# Patient Record
Sex: FEMALE | Race: WHITE | NOT HISPANIC OR LATINO | Employment: OTHER | URBAN - METROPOLITAN AREA
[De-identification: names, ages, dates, MRNs, and addresses within clinical notes are randomized per-mention and may not be internally consistent; named-entity substitution may affect disease eponyms.]

---

## 2017-01-19 PROBLEM — Z79.899 LONG-TERM USE OF HIGH-RISK MEDICATION: Status: ACTIVE | Noted: 2017-01-19

## 2017-07-19 PROBLEM — F17.200 CURRENT EVERY DAY SMOKER: Status: ACTIVE | Noted: 2017-07-19

## 2017-07-19 PROBLEM — R06.02 SHORTNESS OF BREATH: Status: ACTIVE | Noted: 2017-07-19

## 2017-07-19 PROBLEM — R07.9 CHEST PAIN: Status: ACTIVE | Noted: 2017-07-19

## 2018-03-22 PROBLEM — R07.9 CHEST PAIN: Status: RESOLVED | Noted: 2017-07-19 | Resolved: 2018-03-22

## 2018-03-22 PROBLEM — R06.02 SHORTNESS OF BREATH: Status: RESOLVED | Noted: 2017-07-19 | Resolved: 2018-03-22

## 2018-04-05 PROBLEM — E78.00 HYPERCHOLESTEROLEMIA: Status: ACTIVE | Noted: 2018-04-05

## 2018-12-06 ENCOUNTER — HOSPITAL ENCOUNTER (OUTPATIENT)
Dept: RADIOLOGY | Facility: HOSPITAL | Age: 63
Discharge: HOME | End: 2018-12-06
Attending: NURSE PRACTITIONER
Payer: COMMERCIAL

## 2018-12-06 DIAGNOSIS — R05.3 PERSISTENT COUGH: ICD-10-CM

## 2018-12-06 DIAGNOSIS — Z87.891 FORMER SMOKER: ICD-10-CM

## 2018-12-07 PROBLEM — F17.200 CURRENT EVERY DAY SMOKER: Status: RESOLVED | Noted: 2017-07-19 | Resolved: 2018-12-07

## 2018-12-19 ENCOUNTER — HOSPITAL ENCOUNTER (OUTPATIENT)
Dept: RADIOLOGY | Facility: HOSPITAL | Age: 63
Discharge: HOME | End: 2018-12-19
Attending: NURSE PRACTITIONER
Payer: COMMERCIAL

## 2018-12-19 VITALS — BODY MASS INDEX: 21.47 KG/M2 | HEIGHT: 70 IN | WEIGHT: 150 LBS

## 2018-12-19 DIAGNOSIS — Z12.39 SCREENING FOR BREAST CANCER: ICD-10-CM

## 2018-12-19 PROCEDURE — 77067 SCR MAMMO BI INCL CAD: CPT | Mod: 26,,, | Performed by: RADIOLOGY

## 2018-12-19 PROCEDURE — 77067 SCR MAMMO BI INCL CAD: CPT | Mod: TC

## 2019-01-02 ENCOUNTER — HOSPITAL ENCOUNTER (OUTPATIENT)
Dept: RADIOLOGY | Facility: HOSPITAL | Age: 64
Discharge: HOME | End: 2019-01-02
Attending: NURSE PRACTITIONER
Payer: COMMERCIAL

## 2019-01-02 DIAGNOSIS — R92.8 ABNORMAL MAMMOGRAM OF LEFT BREAST: ICD-10-CM

## 2019-01-02 PROCEDURE — 76642 ULTRASOUND BREAST LIMITED: CPT | Mod: TC,LT

## 2019-01-02 PROCEDURE — 76642 US BREAST LEFT LIMITED: ICD-10-PCS | Mod: 26,LT,, | Performed by: RADIOLOGY

## 2019-01-02 PROCEDURE — 77065 DX MAMMO INCL CAD UNI: CPT | Mod: TC,LT

## 2019-01-02 PROCEDURE — 76642 ULTRASOUND BREAST LIMITED: CPT | Mod: 26,LT,, | Performed by: RADIOLOGY

## 2019-01-02 PROCEDURE — 77065 DX MAMMO INCL CAD UNI: CPT | Mod: 26,LT,, | Performed by: RADIOLOGY

## 2019-01-02 PROCEDURE — 77065 MAMMO DIGITAL DIAGNOSTIC LEFT WITH CAD: ICD-10-PCS | Mod: 26,LT,, | Performed by: RADIOLOGY

## 2019-01-10 ENCOUNTER — OFFICE VISIT (OUTPATIENT)
Dept: SURGERY | Facility: CLINIC | Age: 64
End: 2019-01-10
Payer: COMMERCIAL

## 2019-01-10 VITALS
BODY MASS INDEX: 21.62 KG/M2 | OXYGEN SATURATION: 96 % | HEART RATE: 79 BPM | RESPIRATION RATE: 18 BRPM | SYSTOLIC BLOOD PRESSURE: 108 MMHG | WEIGHT: 151 LBS | HEIGHT: 70 IN | TEMPERATURE: 98 F | DIASTOLIC BLOOD PRESSURE: 68 MMHG

## 2019-01-10 DIAGNOSIS — R92.8 ABNORMAL MAMMOGRAM: Primary | ICD-10-CM

## 2019-01-10 PROCEDURE — 99204 OFFICE O/P NEW MOD 45 MIN: CPT | Mod: S$GLB,,, | Performed by: SURGERY

## 2019-01-10 PROCEDURE — 99204 PR OFFICE/OUTPT VISIT, NEW, LEVL IV, 45-59 MIN: ICD-10-PCS | Mod: S$GLB,,, | Performed by: SURGERY

## 2019-01-10 NOTE — PROGRESS NOTES
[facility] H&P Note    Subjective:       Patient ID: Gina Mackenzie is a 63 y.o. female.    Chief Complaint: Consult (Abnormal Mammo)    HPI:  Gina Mackenzie is a 63 y.o. 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  Patient stated that she usually does yearly mammograms for screening.  Last mammogram however was in 2016.  She recently underwent routine screening mammograms which led to an ultrasound.  Ultrasound showed new lesions within patient's left breast.  Patient does monthly breast exams.  She has not noticed any changes.  No skin changes no nipple discharge no nipple inversion.  Patient has been pregnant 4 times.  She has given birth to 4 children.  She breast fed for a total of approximately 2 years.  Fifteen years she used oral contraceptive pills.  She is currently on hormone replacement therapy for the last 15 years, low-dose.  Age of menarche was 12 years of age age of menopause was 45 years of age.  Patient now presents today as a new patient referral for evaluation of abnormal breast imaging    Past Medical History:   Diagnosis Date    Hypothyroidism     Malignant neoplasm of skin     Non-melanoma skin cancer    Squamous cell carcinoma in situ of skin of left lower leg     Wellness examination      Past Surgical History:   Procedure Laterality Date    AUGMENTATION OF BREAST       SECTION      COLONOSCOPY  2015    hpp    MALIGNANT SKIN LESION EXCISION  2016     Family History   Problem Relation Age of Onset    Heart failure Father     Hypertension Father     Stroke Sister     Stroke Brother     Pancreatic cancer Maternal Grandmother     Breast cancer Neg Hx      Social History     Socioeconomic History    Marital status: Unknown     Spouse name: None    Number of children: None    Years  of education: None    Highest education level: None   Social Needs    Financial resource strain: None    Food insecurity - worry: None    Food insecurity - inability: None    Transportation needs - medical: None    Transportation needs - non-medical: None   Occupational History    None   Tobacco Use    Smoking status: Former Smoker     Types: Cigarettes     Last attempt to quit: 2013     Years since quittin.0   Substance and Sexual Activity    Alcohol use: No    Drug use: No    Sexual activity: None   Other Topics Concern    None   Social History Narrative    None       Current Outpatient Medications   Medication Sig Dispense Refill    buPROPion (WELLBUTRIN) 75 MG tablet Take 1 tablet (75 mg total) by mouth 2 (two) times daily. 60 tablet 11    estradiol (ESTRACE) 0.5 MG tablet Take 1 tablet (0.5 mg total) by mouth once daily. 90 tablet 3    levothyroxine (SYNTHROID) 137 MCG Tab tablet Take 1 tablet (137 mcg total) by mouth before breakfast. 90 tablet 3    medroxyPROGESTERone (PROVERA) 2.5 MG tablet Take 1 tablet (2.5 mg total) by mouth once daily. 90 tablet 3     No current facility-administered medications for this visit.      Review of patient's allergies indicates:   Allergen Reactions    Codeine Hives and Swelling       Review of Systems   Constitutional: Negative for activity change, appetite change, chills and fever.   HENT: Negative for congestion, dental problem and ear discharge.    Eyes: Negative for discharge and itching.   Respiratory: Negative for apnea, choking and chest tightness.    Cardiovascular: Negative for chest pain and leg swelling.   Gastrointestinal: Negative for abdominal distention, abdominal pain, anal bleeding, constipation, diarrhea and nausea.   Endocrine: Negative for cold intolerance and heat intolerance.   Genitourinary: Negative for difficulty urinating and dyspareunia.   Musculoskeletal: Negative for arthralgias and back pain.   Skin: Negative for color  "change and pallor.   Neurological: Negative for dizziness and facial asymmetry.   Hematological: Negative for adenopathy. Does not bruise/bleed easily.   Psychiatric/Behavioral: Negative for agitation and behavioral problems.       Objective:      Vitals:    01/10/19 1315   BP: 108/68   BP Location: Right arm   Patient Position: Sitting   Pulse: 79   Resp: 18   Temp: 98.4 °F (36.9 °C)   TempSrc: Oral   SpO2: 96%   Weight: 68.5 kg (151 lb)   Height: 5' 10" (1.778 m)     Physical Exam   Constitutional: She is oriented to person, place, and time. She appears well-developed and well-nourished. No distress.   HENT:   Head: Normocephalic and atraumatic.   Eyes: EOM are normal. Pupils are equal, round, and reactive to light.   Neck: Normal range of motion. Neck supple. No thyromegaly present.   Cardiovascular: Normal rate and regular rhythm.   Pulmonary/Chest: Effort normal and breath sounds normal.   Bilateral breast examined in the upright and supine position. Bilateral axillas also examined. No evidence of skin changes, nipple inversion, nipple discharge, adenopathy in either breast.  No concerning findings.   Abdominal: Soft. Bowel sounds are normal. She exhibits no distension. There is no tenderness.   Musculoskeletal: Normal range of motion. She exhibits no edema or deformity.   Neurological: She is alert and oriented to person, place, and time. No cranial nerve deficit.   Skin: Skin is warm. Capillary refill takes less than 2 seconds. No erythema.   Psychiatric: She has a normal mood and affect. Her behavior is normal.       Lab Review:   CBC:   Lab Results   Component Value Date    WBC 5.6 12/04/2018    RBC 4.50 12/04/2018    HGB 14.6 12/04/2018    HCT 42.2 12/04/2018     12/04/2018     BMP:   Lab Results   Component Value Date    GLU 82 12/04/2018     12/04/2018    K 4.6 12/04/2018    CL 97 (L) 12/04/2018    CO2 27 12/04/2018    BUN 16 12/04/2018    CREATININE 0.83 12/04/2018    CALCIUM 9.3 12/04/2018 "     Diagnostics Review: Mammogram and ultrasounds reviewed.  Left breast.  There are 2 areas of concern.  Both areas and ultrasound show cystic appearing structures with normal appearing borders.     Assessment:       1. Abnormal mammogram        Plan:   Abnormal mammogram        Medical Decision Making/Counseling:  I reviewed the patient's mammogram and ultrasound.  Patient peers have BI-RADS 3 lesions in the patient's left breast.  Patient appears to be relatively low risk for breast cancer with no family history of breast cancer however she does have a current history of hormone replacement therapy usage.  I discussed with the patient the chances of cancer in BI-RADS 3 lesions to be approximately 1-2%.  Questions answered today in clinic.  After exam review of imaging and discussion with patient, the patient  appeared appreciative of their visit.  In medical recommendations at this time will be for early interval follow-up imaging including mammogram and ultrasound.  If there is a change of these lesions on mammogram and ultrasound, the patient may be referred back to surgery clinic for evaluation for biopsy.  Patient in agreement of plan. Total clinic time spent today with patient 45 min of which greater than half the time spent face-to-face counseling

## 2019-01-10 NOTE — LETTER
January 10, 2019      Lisa Y. Cooksey, ALAN  [address]  [place] [address]           [facility] - General Surgery  [address] [facility] [place] [address]  Phone: [phone]  Fax: [phone]          Patient: Gina Mackenzie   MR Number: [id number]   YOB: 1955   Date of Visit: 1/10/2019       Dear Lisa Y. Cooksey:    Thank you for referring Gina Mackenzie to me for evaluation. Attached you will find relevant portions of my assessment and plan of care.    If you have questions, please do not hesitate to call me. I look forward to following Gina Mackenzie along with you.    Sincerely,    Warren Liu MD    Enclosure  CC:  No Recipients    If you would like to receive this communication electronically, please contact [email or web address] or [phone] to request more information on [facility] Link access.    For providers and/or their staff who would like to refer a patient to [facility], please contact us through our one-stop-shop provider referral line, [facility], at [phone].    If you feel you have received this communication in error or would no longer like to receive these types of communications, please e-mail [email or web address]

## 2019-02-07 PROBLEM — F32.A DEPRESSION: Status: ACTIVE | Noted: 2019-02-07

## 2019-04-08 ENCOUNTER — HOSPITAL ENCOUNTER (OUTPATIENT)
Dept: RADIOLOGY | Facility: HOSPITAL | Age: 64
Discharge: HOME | End: 2019-04-08
Attending: NURSE PRACTITIONER
Payer: COMMERCIAL

## 2019-04-08 DIAGNOSIS — M25.512 ACUTE PAIN OF LEFT SHOULDER: ICD-10-CM

## 2019-04-08 DIAGNOSIS — M54.2 NECK PAIN ON LEFT SIDE: ICD-10-CM

## 2019-04-15 ENCOUNTER — TELEPHONE (OUTPATIENT)
Dept: ORTHOPEDICS | Facility: CLINIC | Age: 64
End: 2019-04-15

## 2019-04-15 PROBLEM — Z87.891 HISTORY OF TOBACCO ABUSE: Status: ACTIVE | Noted: 2019-04-15

## 2019-04-15 PROBLEM — B35.3 TINEA PEDIS, LEFT: Status: ACTIVE | Noted: 2019-04-15

## 2019-04-15 PROBLEM — M19.012 LOCALIZED OSTEOARTHROSIS OF LEFT SHOULDER REGION: Status: ACTIVE | Noted: 2019-04-15

## 2019-04-15 PROBLEM — Z78.0 POST-MENOPAUSAL: Status: ACTIVE | Noted: 2019-04-15

## 2019-04-15 PROBLEM — M50.30 DDD (DEGENERATIVE DISC DISEASE), CERVICAL: Status: ACTIVE | Noted: 2019-04-15

## 2019-04-16 ENCOUNTER — TELEPHONE (OUTPATIENT)
Dept: ORTHOPEDICS | Facility: CLINIC | Age: 64
End: 2019-04-16

## 2019-04-16 NOTE — TELEPHONE ENCOUNTER
----- Message from Vicky Wilcox sent at 4/15/2019 12:49 PM CDT -----  Contact: [phone]  Patient is returning nurse's phone call.  Please call patient back at [phone].

## 2019-04-16 NOTE — TELEPHONE ENCOUNTER
----- Message from Kate Galeano sent at 4/16/2019  7:53 AM CDT -----  Type:  Patient Returning Call    Who Called:  self  Who Left Message for Patient:  Macarena  Does the patient know what this is regarding?:  appointment  Best Call Back Number:   [phone]

## 2019-04-24 ENCOUNTER — OFFICE VISIT (OUTPATIENT)
Dept: ORTHOPEDICS | Facility: CLINIC | Age: 64
End: 2019-04-24
Payer: COMMERCIAL

## 2019-04-24 VITALS
SYSTOLIC BLOOD PRESSURE: 104 MMHG | RESPIRATION RATE: 18 BRPM | WEIGHT: 162.94 LBS | HEIGHT: 70 IN | BODY MASS INDEX: 23.33 KG/M2 | DIASTOLIC BLOOD PRESSURE: 49 MMHG | HEART RATE: 71 BPM

## 2019-04-24 DIAGNOSIS — M75.112 NONTRAUMATIC INCOMPLETE TEAR OF ROTATOR CUFF, LEFT: Primary | ICD-10-CM

## 2019-04-24 DIAGNOSIS — M19.012 ARTHRITIS OF LEFT ACROMIOCLAVICULAR JOINT: ICD-10-CM

## 2019-04-24 DIAGNOSIS — M25.512 ACUTE PAIN OF LEFT SHOULDER: Primary | ICD-10-CM

## 2019-04-24 DIAGNOSIS — M19.012 GLENOHUMERAL ARTHRITIS, LEFT: ICD-10-CM

## 2019-04-24 PROCEDURE — 99204 PR OFFICE/OUTPT VISIT, NEW, LEVL IV, 45-59 MIN: ICD-10-PCS | Mod: 25,S$GLB,, | Performed by: ORTHOPAEDIC SURGERY

## 2019-04-24 PROCEDURE — 20610 DRAIN/INJ JOINT/BURSA W/O US: CPT | Mod: LT,S$GLB,, | Performed by: ORTHOPAEDIC SURGERY

## 2019-04-24 PROCEDURE — 99999 PR PBB SHADOW E&M-EST. PATIENT-LVL III: CPT | Mod: PBBFAC,,, | Performed by: ORTHOPAEDIC SURGERY

## 2019-04-24 PROCEDURE — 99204 OFFICE O/P NEW MOD 45 MIN: CPT | Mod: 25,S$GLB,, | Performed by: ORTHOPAEDIC SURGERY

## 2019-04-24 PROCEDURE — 20610 LARGE JOINT ASPIRATION/INJECTION: L GLENOHUMERAL: ICD-10-PCS | Mod: LT,S$GLB,, | Performed by: ORTHOPAEDIC SURGERY

## 2019-04-24 PROCEDURE — 99999 PR PBB SHADOW E&M-EST. PATIENT-LVL III: ICD-10-PCS | Mod: PBBFAC,,, | Performed by: ORTHOPAEDIC SURGERY

## 2019-04-24 RX ORDER — TRIAMCINOLONE ACETONIDE 40 MG/ML
40 INJECTION, SUSPENSION INTRA-ARTICULAR; INTRAMUSCULAR
Status: DISCONTINUED | OUTPATIENT
Start: 2019-04-24 | End: 2019-04-24 | Stop reason: HOSPADM

## 2019-04-24 RX ADMIN — TRIAMCINOLONE ACETONIDE 40 MG: 40 INJECTION, SUSPENSION INTRA-ARTICULAR; INTRAMUSCULAR at 02:04

## 2019-04-24 NOTE — PROGRESS NOTES
Subjective:      Patient ID: Gina Mackenzie is a 63 y.o. female.    Chief Complaint: Pain of the Left Shoulder    Referring Provider: Lisa Y. Cooksey, Np  [address] [place] [address]    HPI:  Ms. Mackenzie is a 63-year-old right-hand-dominant female who presented today for evaluation of right shoulder pain which began 2018 of insidious onset she denied trauma.  She stated that she has been getting progressive decreased motion and a burning sensation in her shoulder occasionally.  She has seen a chiropractor which did not help.  She also went to massage therapist which did not help.  Abduction and external rotation increase her symptoms while nothing seems to improve it.  Her symptoms do not awaken her at night.  She has taken NSAIDs with some help.  She has not done physical therapy but has used to chiropractor without help.  She has not had injections.    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    ROS:   Review of Systems   Constitution: Negative for chills and fever.   HENT: Negative for congestion.    Eyes: Negative for blurred vision.   Cardiovascular: Negative for chest pain.   Respiratory: Negative for cough.    Endocrine: Negative for polydipsia.   Hematologic/Lymphatic: Does not bruise/bleed easily.   Skin: Positive for skin cancer.   Musculoskeletal: Positive for joint pain. Negative for gout.   Gastrointestinal: Negative for constipation, diarrhea and heartburn.   Genitourinary: Negative for nocturia.   Neurological: Negative for headaches and seizures.   Psychiatric/Behavioral: Negative for depression.   Allergic/Immunologic: Negative for environmental allergies.           Objective:      Physical Exam:   General: AAOx3.  No acute distress  HEENT: Normocephalic, PEARLA EOMI, Good Dentition  Neck: Supple, No JVD  Chest: Symetric, equal excursion on inspiration  Abdomen: Soft NTND  Vascular:  Pulses intact and equal bilaterally.  Capillary refill less than 3 seconds and equal bilaterally  Neurologic:  Pinprick and soft touch intact and equal bilaterally.  Spurling's negative  Integment:  No ecchymosis, no errythema  Extremity:  Shoulder:  Forward flexion/abduction equal bilaterally 0/170 degrees. Internal rotation right shoulder T6, left shoulder L3.  Negative drop-arm both shoulders.  Negative lift-off both shoulders.  Full can negative both shoulders.  Empty can mildly positive left shoulder.  Hubbard/Neer positive left shoulder.  Cross-arm negative both shoulders.  Nontender over the AC joint both shoulders.  Minor tenderness in the bicipital groove left shoulder.  Yergason's negative both shoulders.  Apprehension/relocation negative both shoulders.  Radiography:  Personally reviewed x-rays of the left shoulder completed on 04/08/2019 which showed AC and glenohumeral  arthritis no fracture or dislocation.  X-rays of the C-spine completed on 04/08/2019 showed flattening of the cervical lordosis arthritic changes from C4-C7 with foraminal stenosis C4-C7.      Assessment:       Impression:      1. Nontraumatic incomplete tear of rotator cuff, left    2. Glenohumeral arthritis, left    3. Arthritis of left acromioclavicular joint          Plan:       1.  Discussed physical examination and radiographic findings with the patient. Gina understands that she has an incomplete rotator cuff tear but with some conservative management she may improve if she does not improve with conservative management then she could consider surgical intervention.  2.  Offered a steroid injection to the left shoulder, she elected to proceed.  3.  Discussed NSAID use with the patient.  4.  Home exercises to include wall walking, cane exercises, towel exercises, and Codman exercises were shown discussed.  5.  Refer to occupational therapy start motion exercises.  6.  [facility] portal was discussed with the patient and information was given.  The patient was encouraged to use the portal for future encounters.  7.  Follow up in approximately 6 weeks for re-evaluation if the patient has not improve may consider referral for an MRI and discuss surgery at that time.

## 2019-04-24 NOTE — PROCEDURES
Large Joint Aspiration/Injection: L glenohumeral  Date/Time: 4/24/2019 2:46 PM  Performed by: Skyler Phoenix DO  Authorized by: Skyler Phoenix, DO     Consent Done?:  Yes (Verbal)  Indications:  Pain and diagnostic evaluation  Procedure site marked: Yes    Timeout: Prior to procedure the correct patient, procedure, and site was verified      Location:  Shoulder  Site:  L glenohumeral  Prep: Patient was prepped and draped in usual sterile fashion    Ultrasonic Guidance for needle placement: No  Needle size:  22 G  Approach:  Posterior  Medications:  40 mg triamcinolone acetonide 40 mg/mL  Patient tolerance:  Patient tolerated the procedure well with no immediate complications

## 2019-04-24 NOTE — LETTER
April 24, 2019      Lisa Y. Cooksey, ALAN  [address] [place] [address]           [facility] - Orthopedics  [address] [place] [address]  [place] [address]  Phone: [phone]  Fax: [phone]          Patient: Gina Mackenzie   MR Number: [id number]   YOB: 1955   Date of Visit: 4/24/2019       Dear Lisa Y. Cooksey:    Thank you for referring Gina Mackenzie to me for evaluation. Attached you will find relevant portions of my assessment and plan of care.    If you have questions, please do not hesitate to call me. I look forward to following Gina Mackenzie along with you.    Sincerely,    Skyler Phoenix, DO    Enclosure  CC:  No Recipients    If you would like to receive this communication electronically, please contact [email or web address] or [phone] to request more information on [facility] Link access.    For providers and/or their staff who would like to refer a patient to [facility], please contact us through our one-stop-shop provider referral line, [facility], at [phone].    If you feel you have received this communication in error or would no longer like to receive these types of communications, please e-mail [email or web address]

## 2019-04-29 ENCOUNTER — TELEPHONE (OUTPATIENT)
Dept: ORTHOPEDICS | Facility: CLINIC | Age: 64
End: 2019-04-29

## 2019-04-29 NOTE — TELEPHONE ENCOUNTER
----- Message from Florinda Hough sent at 4/29/2019 11:22 AM CDT -----  Contact: Patient  Type: Needs Medical Advice    Who Called:  patient  Symptoms (please be specific):  tomás  How long has patient had these symptoms:  tomás  Pharmacy name and phone #:  tomás  Best Call Back Number: [phone]  Additional Information: Patient states she was wandering is the physical therapy office going to contact her for her appointment.Please call to advise. Thanks!

## 2019-04-29 NOTE — TELEPHONE ENCOUNTER
Contacted [facility]. Patient will be contacted to schedule an appointment.

## 2019-04-29 NOTE — TELEPHONE ENCOUNTER
Pt called to check on PT referral. Pt called, LM insurance approval is still pending and to call [phone] for any questions

## 2019-05-06 ENCOUNTER — CLINICAL SUPPORT (OUTPATIENT)
Dept: REHABILITATION | Facility: HOSPITAL | Age: 64
End: 2019-05-06
Attending: ORTHOPAEDIC SURGERY
Payer: COMMERCIAL

## 2019-05-06 DIAGNOSIS — M19.012 GLENOHUMERAL ARTHRITIS, LEFT: ICD-10-CM

## 2019-05-06 DIAGNOSIS — M19.012 LOCALIZED OSTEOARTHROSIS OF LEFT SHOULDER REGION: ICD-10-CM

## 2019-05-06 DIAGNOSIS — M25.612 DECREASED ROM OF LEFT SHOULDER: Primary | ICD-10-CM

## 2019-05-06 PROCEDURE — 97161 PT EVAL LOW COMPLEX 20 MIN: CPT | Mod: PN

## 2019-05-06 NOTE — PLAN OF CARE
[facility]  Physical Therapy Initial Evaluation    Name: Gina Mackenzie  Clinic Number: [id number]    Therapy Diagnosis:   Encounter Diagnoses   Name Primary?    Glenohumeral arthritis, left     Localized osteoarthrosis of left shoulder region     Decreased ROM of left shoulder Yes     Physician: Skyler Phoenix DO    Physician Orders: PT Eval and Treat   Medical Diagnosis: Left Shoulder RTC tear  Evaluation Date: 2019  Authorization period Expiration: 2019  Plan of Care Certification Period: 2019    Visit #: 1/ Visits authorized: 12  Time In:2:00 PM   Time Out: 3:00 PM  Total Billable Time: 50 minutes    Precautions: Standard    Subjective   Date of onset: 2018  Date of Surgery: N/A - did have steroid injection 2 weeks     Past Medical History:   Diagnosis Date    Hypothyroidism     Malignant neoplasm of skin     Non-melanoma skin cancer    Squamous cell carcinoma in situ of skin of left lower leg 2016    Wellness examination      Gina Mackenzie  has a past surgical history that includes  section (); Malignant skin lesion excision (2016); Colonoscopy (2015); and Augmentation of breast.    Gina has a current medication list which includes the following prescription(s): bupropion, clotrimazole-betamethasone 1-0.05%, estradiol, levothyroxine, and medroxyprogesterone.    Review of patient's allergies indicates:   Allergen Reactions    Codeine Hives and Swelling        Imaging, : Xray of left shoulder    AC and glenohumeral arthritis no fracture or dislocation.  X-rays of the C-spine completed on 2019 showed flattening of the cervical lordosis arthritic changes from C4-C7 with foraminal stenosis C4-C7.        Prior Therapy: No prior history of therapy; did go to chiropracter and massage therapy for left shoulder without any lasting pain relief.   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   Current Level of Function: currently still working; but noting decreased ability to use her left arm for functional activities.     Pain:  Current 1/10, worst 9/10, best 0/10   Location: shoulder  left  Description: Aching and Grabbing  Aggravating Factors: using her arm above her head or behind her back for activities.   Easing Factors: rest; steroid injection .      Onset/JESSE: insidious - not sure what caused her shoulder to start hurting.     History of current condition - Gina reports: 6 month  history of symptoms, including shoulder pain, decreased ROM, decreased strength     Pts goals: To get rid of my shoulder pain and return to PLOF         Objective     Observation: Gina is alert/oriented x 4; currently going to the gym, but quit doing UE exercises until she finds out what she should do; She is reporting significant improvement in her pain and ability to use her arm since receiving asteroid injection a couple of weeks ago.     Posture:     -       Affected scapula abducted    Shoulder Range of Motion:   Left active Left Passive Right active  Right Passive   Flexion 152 156 170 170   Abduction 142 145 170 170   Extension 50 53 55 55   Ext. Rotation HBH to T1 70 HBH to T2 80   Int. Rotation Hand to posterior iliac crest 54 TUB to T6  85         Upper Extremity Strength  (R) UE  (L) UE    Shoulder flexion: 5/5 Shoulder flexion: 5/5   Shoulder Abduction: 5/5 Shoulder abduction: 5/5   Shoulder ER 5/5 Shoulder ER 4/5   Shoulder IR 5/5 Shoulder IR 5/5   Lower Trap 4+/5 Lower Trap 4+/5   Middle Trap 4+/5 Middle Trap 4+/5   Rhomboids 4+/5 Rhomboids 4+/5     Special Tests:   Left Right   AC Joint Compression Test Negative Negative   Empty Can Test Positive Negative   Drop Arm test Negative Negative   Subscaputlaris Lift Off Negative Negative    Clunk test Negative Negative   O'emmanuel's test Negative Negative   Hawkin's Kenndy Negative Negative   Neer's Test Positive Negative   Speed's test Negative Negative   Anterior Apprehension test Negative Negative   Relocation test Negative Negative   Posterior Apprehension test Negative Negative   Sulcus Sign Negative Negative     Joint mobility: restricted    Palpation:minimal tenderness to palpation at supraspinatous; upper trap; subscapularis    Sensation: intact to light touch     Flexibility:     Upper Trap = R no restriction, L no restriction   Scalenes: R no restriction, L no restriction   SCM: R no restriction, L no restriction   Levator Scap: R no restriction, L no restriction    Scapular Control/Dyskinesis:    Normal / Subtle / Obvious  Comments    Left  Decreased scapular movement Tightness noted    Right  Normal  n/a       PT Evaluation Completed? Yes  Discussed Plan of Care with patient: Yes    TREATMENT   Gina received therapeutic exercises to develop ROM for 15 minutes including:  Wall Slides - bilateral hands - keep neck retracted  Scapular retraction with tband  Sleeper stretch       Home Exercises and Patient Education Provided    Education provided re:   - progress towards goals   - role of therapy in multi - disciplinary team, goals for therapy  Pt educated on condition, POC, and expectations in therapy.  No spiritual or educational barriers to learning provided    Home exercises:  Pt will be provided HEP during course of treatment with progressions as appropriate. Pt was advised to perform these exercises free of pain, and to stop performing them if pain occurs.   Gina demonstrated good  understanding of the education provided.       Functional Limitations Reports - G Codes  Category: Mobility, Body position, Carrying, Self care, Other  Tool: UEFS  Score: 45% limitation     Assessment   Gina is a 63 y.o. female referred to outpatient physical therapy and presents to PT with left shoulder  pain and loss of ROM . Patient demonstrates limitations as described in the problem list. Pt will benefit from physcial therapy services in order to maximize pain free and/or functional use of left Shoulder. The following goals were discussed with the patient and patient is in agreement with them as to be addressed in the treatment plan.   Pt prognosis is Excellent.   Pt will benefit from skilled outpatient Physical Therapy to address the deficits stated above and in the chart below, provide pt/family education, and to maximize pt's level of independence.     Plan of care discussed with patient: Yes  Pt's spiritual, cultural and educational needs considered and pt agreeable to plan of care and goals as stated below:     Anticipated Barriers for therapy: none    Medical necessity is demonstrated by the following IMPAIRMENTS/PROBLEM LIST:    weakness, decreased upper extremity function, pain and decreased ROM    GOALS:     Long Term Goals: 6 weeks  Pain: Decrease pain to 0/10 to allow for return of full Left shoulder function   Strength: Improve strength in left shoulder and periscapular muscles to 5/5 for improved shoulder stability  ROM: Improve ROM to 100% of normal limits   Functional scale: Improve score on UEFS to <5% limitation   Lifting: Lift 40 lbs to waist level, 20 lbs to shoulder level, 10 lbs to overhead without pain or compensation  Postures: Increase sitting and/or standing duration to 60 mins without pain   Transfers: Perform home care patient transfers without increased pain or limitation  Exercise: demonstrate independence with home exercise program to maintain gains made in therapy.          Plan   Certification Period: 5/6/2019 to 7/31/2019.    Outpatient physical therapy 1 -2 times weekly to include: Manual Therapy, Moist Heat/ Ice, Neuromuscular Re-ed, Patient Education and Therapeutic Exercise. Cont PT for 2 months.   Pt may be seen by PTA as part of the rehabilitation team.     I certify the  need for these services furnished under this plan of treatment and while under my care.    Amairani Quinones, PT          Attestation:   I have seen the patient, reviewed the therapist's plan of care, and I agree with the plan of care.   I certify the need for these services furnished under this plan of treatment and while under my care.         _______________            ________                                               _____________________  Physician/Referring Practitioner                                                            Date of Signature

## 2019-05-10 ENCOUNTER — CLINICAL SUPPORT (OUTPATIENT)
Dept: REHABILITATION | Facility: HOSPITAL | Age: 64
End: 2019-05-10
Attending: ORTHOPAEDIC SURGERY
Payer: COMMERCIAL

## 2019-05-10 DIAGNOSIS — M19.012 LOCALIZED OSTEOARTHROSIS OF LEFT SHOULDER REGION: ICD-10-CM

## 2019-05-10 DIAGNOSIS — M25.612 DECREASED ROM OF LEFT SHOULDER: Primary | ICD-10-CM

## 2019-05-10 PROCEDURE — 97140 MANUAL THERAPY 1/> REGIONS: CPT | Mod: PN

## 2019-05-10 PROCEDURE — 97110 THERAPEUTIC EXERCISES: CPT | Mod: PN

## 2019-05-10 NOTE — PROGRESS NOTES
"                                                    Physical Therapy Daily Note     Name: Gina Mackenzie  Clinic Number: [id number]  Diagnosis:   Encounter Diagnoses   Name Primary?    Decreased ROM of left shoulder Yes    Localized osteoarthrosis of left shoulder region      Physician: Skyler Phoenix,   Precautions: standard  Visit #: 2 of 12  PTA Visit #: 0  Time In: 7:00 AM  Time Out: 8:00 AM     Subjective     Pt reports: "I'm about the same"   Pain Scale: Gina rates pain on a scale of 0-10 to be 4 currently.    Objective     Gina received individual therapeutic exercises to develop strength and ROM for 20 minutes includin. SL Ext Rotation: 3 # x 15   2. Serratus Lifts: 3# x 20  3. SL external Rotation 2# x 15  4. Prone series - abduction, extension, rows, flexion: 3# x 15 each  5. Wall Push-Ups 2-way x 15 each  6. 3-way Scapular Retraction - gray tband x 15 each  7. Shld IR/ER x 15 each with green tband  8. Lat pulldown  9. Seated Row     Gina received the following manual therapy techniques: Joint mobilizations and Soft tissue Mobilization were applied to the: left shoulder  for 25 minutes including:  Grade II to II GH glides into lateral, inferior and posterior planes; Sleeper stretch; posterior capsule stretch; subscapularis - MET to release restrictions; teres minor: contract/relax to improve GH movement.      The patient received the following direct contact modalities after being cleared for contraindications:     The patient received the following supervised modalities after being cleared for contradictions:     Written Home Exercises Provided: see above - given written handouts of each exercise.   Pt demo good understanding of the education provided. Gina demonstrated good return demonstration of activities.     Education provided re:  Gina verbalized good understanding of education provided.   No spiritual or educational barriers to learning provided    Assessment     Patient tolerated " treatment well; able to get hand to spine after manual treatment. .   This is a 63 y.o. female referred to outpatient physical therapy and presents with a medical diagnosis of left shoulder adhesive capsulitis; RTC tear and demonstrates limitations as described in the problem list. Pt prognosis is Excellent. Pt will continue to benefit from skilled outpatient physical therapy to address the deficits listed in the problem list, provide pt/family education and to maximize pt's level of independence in the home and community environment.     Goals as follows:     Long Term Goals: 6 weeks  Pain: Decrease pain to 0/10 to allow for return of full Left shoulder function   Strength: Improve strength in left shoulder and periscapular muscles to 5/5 for improved shoulder stability  ROM: Improve ROM to 100% of normal limits   Functional scale: Improve score on UEFS to <5% limitation   Lifting: Lift 40 lbs to waist level, 20 lbs to shoulder level, 10 lbs to overhead without pain or compensation  Postures: Increase sitting and/or standing duration to 60 mins without pain   Transfers: Perform home care patient transfers without increased pain or limitation  Exercise: demonstrate independence with home exercise program to maintain gains made in therapy.            Plan     Continue with established Plan of Care towards PT goals.    Therapist: Amairani Quinones, PT  5/10/2019

## 2019-05-13 ENCOUNTER — CLINICAL SUPPORT (OUTPATIENT)
Dept: REHABILITATION | Facility: HOSPITAL | Age: 64
End: 2019-05-13
Attending: ORTHOPAEDIC SURGERY
Payer: COMMERCIAL

## 2019-05-13 DIAGNOSIS — M25.612 DECREASED ROM OF LEFT SHOULDER: Primary | ICD-10-CM

## 2019-05-13 PROCEDURE — 97110 THERAPEUTIC EXERCISES: CPT | Mod: PN

## 2019-05-13 PROCEDURE — 97140 MANUAL THERAPY 1/> REGIONS: CPT | Mod: PN

## 2019-05-13 NOTE — PROGRESS NOTES
"                                                    Physical Therapy Daily Note     Name: Gina Mackenzie  Clinic Number: [id number]  Diagnosis:   Encounter Diagnosis   Name Primary?    Decreased ROM of left shoulder Yes     Physician: Skyler Phoenix,   Precautions: standard  Visit #: 3 of 12  PTA Visit #: 0  Time In:  8:00 AM  Time Out:  9:00 AM      Subjective     Pt reports:  My arm was alittle sore, but I have more movement"   Pain Scale: Gina rates pain on a scale of 0-10 to be 4 currently.    Objective     Gina received individual therapeutic exercises to develop strength and ROM for 20 minutes includin. SL Ext Rotation: 3 # x 15   2. Serratus Lifts: 3# x 20  3. SL external Rotation 2# x 15  4. Prone series - abduction, extension, rows, flexion: 3# x 15 each  5. Wall Push-Ups 2-way x 15 each  6. 3-way Scapular Retraction - gray tband x 15 each  7. Shld IR/ER x 15 each with green tband  8. Lat pulldown  9. Seated Row     Gina received the following manual therapy techniques: Joint mobilizations and Soft tissue Mobilization were applied to the: left shoulder  for 25 minutes including:  Grade II to II GH glides into lateral, inferior and posterior planes; Sleeper stretch; posterior capsule stretch; subscapularis - MET to release restrictions; teres minor: contract/relax to improve GH movement.      The patient received the following direct contact modalities after being cleared for contraindications:     The patient received the following supervised modalities after being cleared for contradictions:     Written Home Exercises Provided: see above - given written handouts of each exercise. Added corner stretching for ER and Pec mm.   Pt demo good understanding of the education provided. Gina demonstrated good return demonstration of activities.     Education provided re:  Gina verbalized good understanding of education provided.   No spiritual or educational barriers to learning " provided    Assessment     Patient tolerated treatment well; able to get hand to spine after manual treatment.  Flexion to 158 degrees today; Less restriction in Subscapularis noted.   This is a 63 y.o. female referred to outpatient physical therapy and presents with a medical diagnosis of left shoulder adhesive capsulitis; RTC tear and demonstrates limitations as described in the problem list. Pt prognosis is Excellent. Pt will continue to benefit from skilled outpatient physical therapy to address the deficits listed in the problem list, provide pt/family education and to maximize pt's level of independence in the home and community environment.     Goals as follows:     Long Term Goals: 6 weeks  Pain: Decrease pain to 0/10 to allow for return of full Left shoulder function   Strength: Improve strength in left shoulder and periscapular muscles to 5/5 for improved shoulder stability  ROM: Improve ROM to 100% of normal limits   Functional scale: Improve score on UEFS to <5% limitation   Lifting: Lift 40 lbs to waist level, 20 lbs to shoulder level, 10 lbs to overhead without pain or compensation  Postures: Increase sitting and/or standing duration to 60 mins without pain   Transfers: Perform home care patient transfers without increased pain or limitation  Exercise: demonstrate independence with home exercise program to maintain gains made in therapy.            Plan     Continue with established Plan of Care towards PT goals.    Therapist: Amairani Quinones, PT  5/13/2019

## 2019-05-17 ENCOUNTER — CLINICAL SUPPORT (OUTPATIENT)
Dept: REHABILITATION | Facility: HOSPITAL | Age: 64
End: 2019-05-17
Attending: ORTHOPAEDIC SURGERY
Payer: COMMERCIAL

## 2019-05-17 DIAGNOSIS — M75.112 NONTRAUMATIC INCOMPLETE TEAR OF ROTATOR CUFF, LEFT: ICD-10-CM

## 2019-05-17 DIAGNOSIS — M25.612 DECREASED ROM OF LEFT SHOULDER: Primary | ICD-10-CM

## 2019-05-17 PROCEDURE — 97140 MANUAL THERAPY 1/> REGIONS: CPT | Mod: PN

## 2019-05-17 NOTE — PROGRESS NOTES
"                                                    Physical Therapy Daily Note     Name: Gina Mackenzie  Clinic Number: [id number]  Diagnosis:   Encounter Diagnoses   Name Primary?    Decreased ROM of left shoulder Yes    Nontraumatic incomplete tear of rotator cuff, left      Physician: Skyler Phoenix,   Precautions: standard  Visit #: 4 of 12  PTA Visit #: 0  Time In:  7:00  AM  Time Out: 7:40  AM      Subjective     Pt reports:  My arm was alittle sore, but I have more movement"   Pain Scale: Gina rates pain on a scale of 0-10 to be 4 currently.    Objective     Gina received individual therapeutic exercises to develop strength and ROM for 20 minutes includin. SL Ext Rotation: 3 # x 15   2. Serratus Lifts: 3# x 20  3. SL external Rotation 2# x 15  4. Prone series - abduction, extension, rows, flexion: 3# x 15 each  5. Wall Push-Ups 2-way x 15 each  6. 3-way Scapular Retraction - gray tband x 15 each  7. Shld IR/ER x 15 each with green tband  8. Lat pulldown  9. Seated Row     Gina received the following manual therapy techniques: Joint mobilizations and Soft tissue Mobilization were applied to the: left shoulder  for 25 minutes including:  Grade II to II GH glides into lateral, inferior and posterior planes; Sleeper stretch; posterior capsule stretch; subscapularis - MET to release restrictions; teres minor: contract/relax to improve GH movement.  Added/instructed patient in  PNF patterns to incorporate rotation into overhead shoulder motion.     The patient received the following direct contact modalities after being cleared for contraindications:     The patient received the following supervised modalities after being cleared for contradictions:     Written Home Exercises Provided: see above - given written handouts of each exercise. Added corner stretching for ER and Pec mm.   Pt demo good understanding of the education provided. Gina demonstrated good return demonstration of activities. "     Education provided re:  Gina verbalized good understanding of education provided.   No spiritual or educational barriers to learning provided    Assessment     Patient tolerated treatment well; able to get hand to spine after manual treatment.  Flexion to 158 degrees today; Less restriction in Subscapularis noted.   This is a 63 y.o. female referred to outpatient physical therapy and presents with a medical diagnosis of left shoulder adhesive capsulitis; RTC tear and demonstrates limitations as described in the problem list. Pt prognosis is Excellent. Pt will continue to benefit from skilled outpatient physical therapy to address the deficits listed in the problem list, provide pt/family education and to maximize pt's level of independence in the home and community environment.     Goals as follows:     Long Term Goals: 6 weeks  Pain: Decrease pain to 0/10 to allow for return of full Left shoulder function   Strength: Improve strength in left shoulder and periscapular muscles to 5/5 for improved shoulder stability  ROM: Improve ROM to 100% of normal limits   Functional scale: Improve score on UEFS to <5% limitation   Lifting: Lift 40 lbs to waist level, 20 lbs to shoulder level, 10 lbs to overhead without pain or compensation  Postures: Increase sitting and/or standing duration to 60 mins without pain   Transfers: Perform home care patient transfers without increased pain or limitation  Exercise: demonstrate independence with home exercise program to maintain gains made in therapy.            Plan     Continue with established Plan of Care towards PT goals.    Therapist: Amairani Quinones, PT  5/17/2019

## 2019-05-20 ENCOUNTER — CLINICAL SUPPORT (OUTPATIENT)
Dept: REHABILITATION | Facility: HOSPITAL | Age: 64
End: 2019-05-20
Attending: ORTHOPAEDIC SURGERY
Payer: COMMERCIAL

## 2019-05-20 DIAGNOSIS — M25.612 DECREASED ROM OF LEFT SHOULDER: Primary | ICD-10-CM

## 2019-05-20 DIAGNOSIS — M75.112 NONTRAUMATIC INCOMPLETE TEAR OF ROTATOR CUFF, LEFT: ICD-10-CM

## 2019-05-20 PROCEDURE — 97140 MANUAL THERAPY 1/> REGIONS: CPT | Mod: PN

## 2019-05-20 NOTE — PROGRESS NOTES
Physical Therapy Daily Note     Name: Gina Mackenzie  Clinic Number: [id number]  Diagnosis:   Encounter Diagnoses   Name Primary?    Decreased ROM of left shoulder Yes    Nontraumatic incomplete tear of rotator cuff, left      Physician: Skyler Phoenix,   Precautions: standard  Visit #:  5 of 12  PTA Visit #: 0  Time In:  7:00  AM  Time Out: 7:40  AM      Subjective     Pt reports:  My arm is still sore right at the top. End range pain noted.   Pain Scale: Gina rates pain on a scale of 0-10 to be 4 currently.    Objective     Gina received individual therapeutic exercises to develop strength and ROM for 20 minutes includin. SL Ext Rotation: 3 # x 15   2. Serratus Lifts: 3# x 20  3. SL external Rotation 2# x 15  4. Prone series - abduction, extension, rows, flexion: 3# x 15 each  5. Wall Push-Ups 2-way x 15 each  6. 3-way Scapular Retraction - gray tband x 15 each  7. Shld IR/ER x 15 each with green tband  8. Lat pulldown  9. Seated Row     Gina received the following manual therapy techniques: Joint mobilizations and Soft tissue Mobilization were applied to the: left shoulder  for 25 minutes including:  Grade II to II GH glides into lateral, inferior and posterior planes; Sleeper stretch; posterior capsule stretch; Pectoralis major/minor MET to release restrictions followed by D2 extension with elbow flexed;   teres minor: contract/relax to improve GH movement.  Added/instructed patient in  PNF patterns to incorporate rotation into overhead shoulder motion.     The patient received the following direct contact modalities after being cleared for contraindications:     The patient received the following supervised modalities after being cleared for contradictions:     Written Home Exercises Provided: see above - given written handouts of each exercise. Added corner stretching for ER and Pec mm.   Pt demo good understanding of the education provided.  Gina demonstrated good return demonstration of activities.     Education provided re:  Gina verbalized good understanding of education provided.   No spiritual or educational barriers to learning provided    Assessment     Patient tolerated treatment well.  AROM movements have significantly improved.    This is a 63 y.o. female referred to outpatient physical therapy and presents with a medical diagnosis of left shoulder adhesive capsulitis; RTC tear and demonstrates limitations as described in the problem list. Pt prognosis is Excellent. Pt will continue to benefit from skilled outpatient physical therapy to address the deficits listed in the problem list, provide pt/family education and to maximize pt's level of independence in the home and community environment.     Shoulder Range of Motion:Updated 5/20/19     Left active Left Passive Right active  Right Passive   Flexion 152 > 165 156 > 170  170 170   Abduction 142 > 160 145 > 165 170 170   Extension 50 > 55 53 > 55 55 55   Ext. Rotation HBH to T1 >T3  70 > 85 HBH to T2 80   Int. Rotation Hand to posterior iliac crest > TUB to T10 54 > 73 TUB to T6  85           Goals as follows:     Long Term Goals: 6 weeks  Pain: Decrease pain to 0/10 to allow for return of full Left shoulder function   Strength: Improve strength in left shoulder and periscapular muscles to 5/5 for improved shoulder stability  ROM: Improve ROM to 100% of normal limits   Functional scale: Improve score on UEFS to <5% limitation   Lifting: Lift 40 lbs to waist level, 20 lbs to shoulder level, 10 lbs to overhead without pain or compensation  Postures: Increase sitting and/or standing duration to 60 mins without pain   Transfers: Perform home care patient transfers without increased pain or limitation  Exercise: demonstrate independence with home exercise program to maintain gains made in therapy.            Plan     Continue with established Plan of Care towards PT goals.    Therapist: Amairani  Joni, PT  5/20/2019

## 2019-05-22 ENCOUNTER — CLINICAL SUPPORT (OUTPATIENT)
Dept: REHABILITATION | Facility: HOSPITAL | Age: 64
End: 2019-05-22
Attending: ORTHOPAEDIC SURGERY
Payer: COMMERCIAL

## 2019-05-22 DIAGNOSIS — M75.112 NONTRAUMATIC INCOMPLETE TEAR OF ROTATOR CUFF, LEFT: ICD-10-CM

## 2019-05-22 DIAGNOSIS — M19.012 GLENOHUMERAL ARTHRITIS, LEFT: ICD-10-CM

## 2019-05-22 DIAGNOSIS — M25.612 DECREASED ROM OF LEFT SHOULDER: Primary | ICD-10-CM

## 2019-05-22 PROCEDURE — 97140 MANUAL THERAPY 1/> REGIONS: CPT | Mod: PN

## 2019-05-22 NOTE — PROGRESS NOTES
Physical Therapy Daily Note     Name: Gina Mackenzie  Clinic Number: [id number]  Diagnosis:   Encounter Diagnoses   Name Primary?    Decreased ROM of left shoulder Yes    Glenohumeral arthritis, left     Nontraumatic incomplete tear of rotator cuff, left      Physician: Skyler Phoenix, DO  Precautions: standard  Visit #:  6  12  PTA Visit #: 0  Time In:  7:00  AM  Time Out:  7:42 AM    Subjective     Pt reports:  I don't have any pain in my shoulder at rest now, pain only when I put my arm in positions that are still painfull   Pain Scale: Gina rates pain on a scale of 0-10 to be 4 -5 (max) currently.    Objective     Gina received individual therapeutic exercises to develop strength and ROM for 20 minutes includin. SL Ext Rotation: 3 # x 15   2. Serratus Lifts: 3# x 20  3. SL external Rotation 2# x 15  4. Prone series - abduction, extension, rows, flexion: 3# x 15 each  5. Wall Push-Ups 2-way x 15 each  6. 3-way Scapular Retraction - gray tband x 15 each  7. Shld IR/ER x 15 each with green tband  8. Lat pulldown  9. Seated Row     Gina received the following manual therapy techniques: Joint mobilizations and Soft tissue Mobilization were applied to the: left shoulder  for 25 minutes including:  Grade II to II GH glides into lateral, inferior and posterior planes; Sleeper stretch; posterior capsule stretch; Pectoralis major/minor MET to release restrictions followed by D2 extension with elbow flexed;   teres minor: contract/relax to improve GH movement.  Added/instructed patient in  PNF patterns to incorporate rotation into overhead shoulder motion.     The patient received the following direct contact modalities after being cleared for contraindications:     The patient received the following supervised modalities after being cleared for contradictions:     Written Home Exercises Provided: see above - given written handouts of each exercise. Added  corner stretching for ER and Pec mm.   Pt demo good understanding of the education provided. Gina demonstrated good return demonstration of activities.     Education provided re:  Gina verbalized good understanding of education provided.   No spiritual or educational barriers to learning provided    Assessment     Patient tolerated treatment well.  AROM movements have significantly improved. Still getting end range pain into flexion with rotation; but ROM much improved in all planes.    This is a 63 y.o. female referred to outpatient physical therapy and presents with a medical diagnosis of left shoulder adhesive capsulitis; RTC tear and demonstrates limitations as described in the problem list. Pt prognosis is Excellent. Pt will continue to benefit from skilled outpatient physical therapy to address the deficits listed in the problem list, provide pt/family education and to maximize pt's level of independence in the home and community environment.     Shoulder Range of Motion:Updated 5/22/19     Left active Left Passive Right active  Right Passive   Flexion 152 > 165 156 > 170  170 170   Abduction 142 > 160 145 > 165 170 170   Extension 50 > 55 53 > 55 55 55   Ext. Rotation HBH to T1 >T3  70 > 85 HBH to [id number]   Int. Rotation Hand to posterior iliac crest > TUB to T10 54 > 73 TUB to T6  85           Goals as follows:     Long Term Goals: 6 weeks  Pain: Decrease pain to 0/10 to allow for return of full Left shoulder function   Strength: Improve strength in left shoulder and periscapular muscles to 5/5 for improved shoulder stability  ROM: Improve ROM to 100% of normal limits   Functional scale: Improve score on UEFS to <5% limitation   Lifting: Lift 40 lbs to waist level, 20 lbs to shoulder level, 10 lbs to overhead without pain or compensation  Postures: Increase sitting and/or standing duration to 60 mins without pain   Transfers: Perform home care patient transfers without increased pain or  limitation  Exercise: demonstrate independence with home exercise program to maintain gains made in therapy.            Plan     Continue with established Plan of Care towards PT goals.    Therapist: Amairani Quinones, PT  5/22/2019

## 2019-05-27 ENCOUNTER — CLINICAL SUPPORT (OUTPATIENT)
Dept: REHABILITATION | Facility: HOSPITAL | Age: 64
End: 2019-05-27
Attending: ORTHOPAEDIC SURGERY
Payer: COMMERCIAL

## 2019-05-27 DIAGNOSIS — M19.012 GLENOHUMERAL ARTHRITIS, LEFT: ICD-10-CM

## 2019-05-27 DIAGNOSIS — M25.612 DECREASED ROM OF LEFT SHOULDER: Primary | ICD-10-CM

## 2019-05-27 PROCEDURE — 97140 MANUAL THERAPY 1/> REGIONS: CPT | Mod: PN

## 2019-05-27 NOTE — PROGRESS NOTES
Physical Therapy Daily Note     Name: Gina Mackenzie  Clinic Number: [id number]  Diagnosis:   Encounter Diagnoses   Name Primary?    Decreased ROM of left shoulder Yes    Glenohumeral arthritis, left      Physician: Skyler Phoenix,   Precautions: standard  Visit #:    PTA Visit #: 0  Time In:  7:00  AM  Time Out:  7:45 AM    Subjective     Pt reports:  I'm continuing to improve; I can get my hand behind my back much better now; I don't have as much pain at all anymore.   Pain Scale: Gina rates pain on a scale of 0-10 to be 2-3 (max) currently.    Objective     Gina received individual therapeutic exercises to develop strength and ROM for 20 minutes includin. SL Ext Rotation: 3 # x 15   2. Serratus Lifts: 3# x 20  3. SL external Rotation 3# x 15  4. Prone series - abduction, extension, rows, flexion: 3# x 15 each  5. Wall Push-Ups 2-way x 15 each  6. 3-way Scapular Retraction - gray tband x 15 each  7. Shld IR/ER x 15 each with green tband  8. Lat pulldown  9. Seated Row     Gina received the following manual therapy techniques: Joint mobilizations and Soft tissue Mobilization were applied to the: left shoulder  for 25 minutes including:  Grade II to II GH glides into lateral, inferior and posterior planes; Sleeper stretch; posterior capsule stretch; Pectoralis major/minor MET to release restrictions followed by D2 extension with elbow flexed;   teres minor: contract/relax to improve GH movement.  Added/instructed patient in  PNF patterns to incorporate rotation into overhead shoulder motion. Cervical mobilization - general stretching as well as MET for levator and scalene;     The patient received the following direct contact modalities after being cleared for contraindications:     The patient received the following supervised modalities after being cleared for contradictions:     Written Home Exercises Provided: see above - given written  handouts of each exercise.  Instructed patient in Levator and scalene stretches with    Added corner stretching for ER and Pec mm.   Pt demo good understanding of the education provided. Gina demonstrated good return demonstration of activities.     Education provided re:  Gina verbalized good understanding of education provided.   No spiritual or educational barriers to learning provided    Assessment     Patient tolerated treatment well.  AROM movements have significantly improved. Still getting end range pain into flexion with rotation; but ROM much improved in all planes.    This is a 63 y.o. female referred to outpatient physical therapy and presents with a medical diagnosis of left shoulder adhesive capsulitis; RTC tear and demonstrates limitations as described in the problem list. Pt prognosis is Excellent. Pt will continue to benefit from skilled outpatient physical therapy to address the deficits listed in the problem list, provide pt/family education and to maximize pt's level of independence in the home and community environment.     Shoulder Range of Motion:Updated 5/27/2019      Left active Left Passive Right active  Right Passive   Flexion 152 > 165 156 > 170  170 170   Abduction 142 > 160 145 > 165 170 170   Extension 50 > 55 53 > 55 55 55   Ext. Rotation HBH to T1 >T3  70 > 85 HBH to [id number]   Int. Rotation Hand to posterior iliac crest > TUB to T8 54 > 73 TUB to T6  85           Goals as follows:     Long Term Goals: 6 weeks  Pain: Decrease pain to 0/10 to allow for return of full Left shoulder function   Strength: Improve strength in left shoulder and periscapular muscles to 5/5 for improved shoulder stability  ROM: Improve ROM to 100% of normal limits   Functional scale: Improve score on UEFS to <5% limitation   Lifting: Lift 40 lbs to waist level, 20 lbs to shoulder level, 10 lbs to overhead without pain or compensation  Postures: Increase sitting and/or standing duration to 60 mins  without pain   Transfers: Perform home care patient transfers without increased pain or limitation  Exercise: demonstrate independence with home exercise program to maintain gains made in therapy.            Plan     Continue with established Plan of Care towards PT goals.    Therapist: Amairani Quinones, PT  5/27/2019

## 2019-05-31 ENCOUNTER — CLINICAL SUPPORT (OUTPATIENT)
Dept: REHABILITATION | Facility: HOSPITAL | Age: 64
End: 2019-05-31
Attending: ORTHOPAEDIC SURGERY
Payer: COMMERCIAL

## 2019-05-31 DIAGNOSIS — M25.612 DECREASED ROM OF LEFT SHOULDER: Primary | ICD-10-CM

## 2019-05-31 DIAGNOSIS — M19.012 GLENOHUMERAL ARTHRITIS, LEFT: ICD-10-CM

## 2019-05-31 PROCEDURE — 97140 MANUAL THERAPY 1/> REGIONS: CPT | Mod: PN

## 2019-05-31 PROCEDURE — 97110 THERAPEUTIC EXERCISES: CPT | Mod: PN

## 2019-05-31 NOTE — PROGRESS NOTES
"                                                    Physical Therapy Daily Note     Name: Gina Mackenzie  Clinic Number: [id number]  Diagnosis:   Encounter Diagnoses   Name Primary?    Decreased ROM of left shoulder Yes    Glenohumeral arthritis, left      Physician: Skyler Phoenix,   Precautions: standard  Visit #:    PTA Visit #: 0  Time In:  7:00  AM  Time Out:   7:50 AM     Subjective     Pt reports:  Are there any other exercises that I need to be doing?"  Gina states that she is continuing to make progress; still has a little pain at end range, but feels she is so much better.   Pain Scale: Gina rates pain on a scale of 0-10 to be 2-3 (max) currently.    Objective     Gina received individual therapeutic exercises to develop strength and ROM for 20 minutes includin. SL Ext Rotation: 3 # x 15   2. Serratus Lifts: 3# x 20  3. SL external Rotation 3# x 15  4. Prone series - abduction, extension, rows, flexion: 3# x 15 each  5. Wall Push-Ups 2-way x 15 each  6. 3-way Scapular Retraction - gray tband x 15 each  7. Shld IR/ER x 15 each with green tband  8. Lat pulldown  9. Seated Row   10. Diagonals in standing with Red Tband     Gina received the following manual therapy techniques: Joint mobilizations and Soft tissue Mobilization were applied to the: left shoulder  for 25 minutes including:  Grade II to II GH glides into lateral, inferior and posterior planes; Sleeper stretch; posterior capsule stretch; Pectoralis major/minor MET to release restrictions followed by D2 extension with elbow flexed;   teres minor: contract/relax to improve GH movement.  Added/instructed patient in  PNF patterns to incorporate rotation into overhead shoulder motion. Cervical mobilization - general stretching as well as MET for levator and scalene;     The patient received the following direct contact modalities after being cleared for contraindications:     The patient received the following supervised modalities " after being cleared for contradictions:     Written Home Exercises Provided: see above - given written handouts of each exercise.  Instructed patient in Levator and scalene stretches with    Added corner stretching for ER and Pec mm.   Pt demo good understanding of the education provided. Gina demonstrated good return demonstration of activities.     Education provided re:  Gina verbalized good understanding of education provided.   No spiritual or educational barriers to learning provided    Assessment     Patient tolerated treatment well.  AROM movements have significantly improved. Still getting end range pain into flexion with rotation; but ROM much improved in all planes.    This is a 63 y.o. female referred to outpatient physical therapy and presents with a medical diagnosis of left shoulder adhesive capsulitis; RTC tear and demonstrates limitations as described in the problem list. Pt prognosis is Excellent. Pt will continue to benefit from skilled outpatient physical therapy to address the deficits listed in the problem list, provide pt/family education and to maximize pt's level of independence in the home and community environment.     Shoulder Range of Motion:Updated 5/27/2019      Left active Left Passive Right active  Right Passive   Flexion 152 > 165 156 > 170  170 170   Abduction 142 > 160 145 > 165 170 170   Extension 50 > 55 53 > 55 55 55   Ext. Rotation HBH to T1 >T3  70 > 85 HBH to [id number]   Int. Rotation Hand to posterior iliac crest > TUB to T8 54 > 73 TUB to T6  85           Goals as follows:     Long Term Goals: 6 weeks  Pain: Decrease pain to 0/10 to allow for return of full Left shoulder function   Strength: Improve strength in left shoulder and periscapular muscles to 5/5 for improved shoulder stability  ROM: Improve ROM to 100% of normal limits   Functional scale: Improve score on UEFS to <5% limitation   Lifting: Lift 40 lbs to waist level, 20 lbs to shoulder level, 10 lbs to  overhead without pain or compensation  Postures: Increase sitting and/or standing duration to 60 mins without pain   Transfers: Perform home care patient transfers without increased pain or limitation  Exercise: demonstrate independence with home exercise program to maintain gains made in therapy.            Plan     Continue with established Plan of Care towards PT goals. Will see patient 1 visit next week prior to MD appointment - Re-assess at this time.     Therapist: Amairani Quinones, PT  5/31/2019

## 2019-06-05 ENCOUNTER — OFFICE VISIT (OUTPATIENT)
Dept: ORTHOPEDICS | Facility: CLINIC | Age: 64
End: 2019-06-05
Payer: COMMERCIAL

## 2019-06-05 ENCOUNTER — CLINICAL SUPPORT (OUTPATIENT)
Dept: REHABILITATION | Facility: HOSPITAL | Age: 64
End: 2019-06-05
Attending: ORTHOPAEDIC SURGERY
Payer: COMMERCIAL

## 2019-06-05 VITALS — HEIGHT: 70 IN | BODY MASS INDEX: 23.19 KG/M2 | WEIGHT: 162 LBS

## 2019-06-05 DIAGNOSIS — M19.012 ARTHRITIS OF LEFT ACROMIOCLAVICULAR JOINT: ICD-10-CM

## 2019-06-05 DIAGNOSIS — M25.612 DECREASED ROM OF LEFT SHOULDER: Primary | ICD-10-CM

## 2019-06-05 DIAGNOSIS — M75.02 ADHESIVE CAPSULITIS OF LEFT SHOULDER: ICD-10-CM

## 2019-06-05 DIAGNOSIS — M75.82 ROTATOR CUFF TENDINITIS, LEFT: Primary | ICD-10-CM

## 2019-06-05 PROCEDURE — 99213 OFFICE O/P EST LOW 20 MIN: CPT | Mod: S$PBB,,, | Performed by: ORTHOPAEDIC SURGERY

## 2019-06-05 PROCEDURE — 97140 MANUAL THERAPY 1/> REGIONS: CPT | Mod: PN

## 2019-06-05 PROCEDURE — 99999 PR PBB SHADOW E&M-EST. PATIENT-LVL II: CPT | Mod: PBBFAC,,, | Performed by: ORTHOPAEDIC SURGERY

## 2019-06-05 PROCEDURE — 97110 THERAPEUTIC EXERCISES: CPT | Mod: PN

## 2019-06-05 PROCEDURE — 99999 PR PBB SHADOW E&M-EST. PATIENT-LVL II: ICD-10-PCS | Mod: PBBFAC,,, | Performed by: ORTHOPAEDIC SURGERY

## 2019-06-05 PROCEDURE — 99213 PR OFFICE/OUTPT VISIT, EST, LEVL III, 20-29 MIN: ICD-10-PCS | Mod: S$PBB,,, | Performed by: ORTHOPAEDIC SURGERY

## 2019-06-05 NOTE — PROGRESS NOTES
Subjective:      Patient ID: Gina Mackenzie is a 63 y.o. female.    Chief Complaint: Pain of the Left Shoulder      HPI: Ms. Mackenzie return today for re-evaluation of her left shoulder.  She states she has greatly improved.  She has been going to physical therapy and stated that it helps immensely.  The steroid injection given to her at her last visit on 04/24/2019 also helped.  She states she has regained near most of her motion the only place she lacks some motion is with internal rotation but she is almost to the point where she can connect her bra.    ROS:  No new diagnosis/surgery/prescriptions since last office visit on 04/24/2019.  Constitution: Negative for chills and fever.   HENT: Negative for congestion.    Eyes: Negative for blurred vision.   Cardiovascular: Negative for chest pain.   Respiratory: Negative for cough.    Endocrine: Negative for polydipsia.   Hematologic/Lymphatic: Does not bruise/bleed easily.   Skin: Positive for skin cancer.   Musculoskeletal: Positive for joint pain. Negative for gout.   Gastrointestinal: Negative for constipation, diarrhea and heartburn.   Genitourinary: Negative for nocturia.   Neurological: Negative for headaches and seizures.   Psychiatric/Behavioral: Negative for depression.   Allergic/Immunologic: Negative for environmental allergies.        Objective:      Physical Exam:   General: AAOx3.  No acute distress  Vascular:  Pulses intact and equal bilaterally.  Capillary refill less than 3 seconds and equal bilaterally  Neurologic:  Pinprick and soft touch intact and equal bilaterally  Integment:  No ecchymosis, no errythema  Extremity:  Shoulder:  Forward flexion/abduction equal bilaterally 0/175 degrees. Internal rotation right shoulder T5, left shoulder T10.  External rotation equal bilaterally 0/30°.  Full can negative both shoulders.  Empty can mildly positive left shoulder.  [place]/Neer mildly positive left shoulder.  Radiography:  No x-rays done today.       Assessment:       Impression:   1.  Resolving rotator cuff tendinitis, left shoulder.  2.  Resolving arthrofibrosis, left shoulder.  3.  AC arthritis left shoulder peer      Plan:       1.  Discussed physical examination with the patient. Gina understands that she is improving and recommend she continue with conservative management rather than consider surgery since she has improved.  2.  Continue with physical therapy.  3.  Continue with home exercises previously discussed.  4.  Continue with over-the-counter medications dosed per box instructions if have pain.  5.  [facility] portal was discussed with the patient and information was given. The patient was encouraged to use the portal for future encounters.  6.  Follow up p.r.n..

## 2019-06-05 NOTE — PROGRESS NOTES
Physical Therapy Daily Note     Name: Gina Mackenzie  Clinic Number: [id number]  Diagnosis:   Encounter Diagnosis   Name Primary?    Decreased ROM of left shoulder Yes     Physician: Skyler Phoenix,   Precautions: standard  Visit #:  10 of 12  PTA Visit #: 0  Time In:  7:00  AM  Time Out:   7:40 AM     Subjective     Pt reports:  I'm doing really well, I still have some pain at end range of some of my movements; Gina reports 2-3/10 pain with daily activities; stretching her arm into IR (sleeper Stretch) puts pain at about 6/10.   Pain Scale: Gina rates pain on a scale of 0-10 to be 2-3 (max) currently with daily activities     Objective     Gina received individual therapeutic exercises to develop strength and ROM for 20 minutes includin. SL Ext Rotation: 3 # x 15   2. Serratus Lifts: 3# x 20  3. SL external Rotation 3# x 15  4. Prone series - abduction, extension, rows, flexion: 3# x 15 each  5. Wall Push-Ups 2-way x 15 each  6. 3-way Scapular Retraction - gray tband x 15 each  7. Shld IR/ER x 15 each with green tband  8. Lat pulldown  9. Seated Row   10. Diagonals in standing with Red Tband     Gina received the following manual therapy techniques: Joint mobilizations and Soft tissue Mobilization were applied to the: left shoulder  for 25 minutes including:  Grade II to II GH glides into lateral, inferior and posterior planes; Sleeper stretch; posterior capsule stretch; Pectoralis major/minor MET to release restrictions followed by D2 extension with elbow flexed;   teres minor: contract/relax to improve GH movement.  Added/instructed patient in  PNF patterns to incorporate rotation into overhead shoulder motion. Cervical mobilization - general stretching as well as MET for levator and scalene;     The patient received the following direct contact modalities after being cleared for contraindications:     The patient received the following supervised  modalities after being cleared for contradictions:     Written Home Exercises Provided: see above - given written handouts of each exercise.  Instructed patient in Levator and scalene stretches with    Added corner stretching for ER and Pec mm.   Pt demo good understanding of the education provided. Gina demonstrated good return demonstration of activities.     Education provided re:  Gina verbalized good understanding of education provided.   No spiritual or educational barriers to learning provided    Assessment     Patient tolerated treatment well.  AROM movements have significantly improved. Still getting end range pain into flexion with rotation; but ROM much improved in all planes.  Still noting decreased strength in posterior/periscapular shoulder girdle that contributes to impingement in left  GH.  Feel Joelle would benefit from continued Skilled Physical Therapy Intervention to address strength and ROM deficits.    This is a 63 y.o. female referred to outpatient physical therapy and presents with a medical diagnosis of left shoulder adhesive capsulitis; RTC tear and demonstrates limitations as described in the problem list. Pt prognosis is Excellent. Pt will continue to benefit from skilled outpatient physical therapy to address the deficits listed in the problem list, provide pt/family education and to maximize pt's level of independence in the home and community environment.     Shoulder Range of Motion:Updated 6/5/2019      Left active Left Passive Right active  Right Passive   Flexion 152 > 165 156 > 170  170 170   Abduction 142 > 160 145 > 165 170 170   Extension 50 > 55 53 > 55 55 55   Ext. Rotation HBH to T1 >Spine of Scapula  70 > 85 HBH to [id number]   Int. Rotation Hand to posterior iliac crest > TUB to T10  54 > 85 TUB to T6  85         Upper Extremity Strength - updated 6/5/2019   (R) UE   (L) UE     Shoulder flexion: 5/5 Shoulder flexion: 5/5   Shoulder Abduction: 5/5 Shoulder abduction:  5/5   Shoulder ER 5/5 Shoulder ER 4/5 > 4+/5    Shoulder IR 5/5 Shoulder IR 5/5   Lower Trap 4+/5 > 4+/5 Lower Trap 4+/5 > 4+/5   Middle Trap 4+/5 > 4+/5 Middle Trap 4+/5 > 4+/5    Rhomboids 4+/5 > 4+/5 Rhomboids 4+/5 > 4+/5         Goals as follows:     Long Term Goals: 6 weeks - Updated LTG/POC: x 4 weeks: No goals achieved fully as of today, but progress noted towards all of them.   Pain: Decrease pain to 0/10 to allow for return of full Left shoulder function   Strength: Improve strength in left shoulder and periscapular muscles to 5/5 for improved shoulder stability  ROM: Improve ROM to 100% of normal limits   Functional scale: Improve score on UEFS to <5% limitation   Lifting: Lift 40 lbs to waist level, 20 lbs to shoulder level, 10 lbs to overhead without pain or compensation  Postures: Increase sitting and/or standing duration to 60 mins without pain   Transfers: Perform home care patient transfers without increased pain or limitation  Exercise: demonstrate independence with home exercise program to maintain gains made in therapy.            Plan     Continue with established Plan of Care towards PT goals. Recommend continuation with Physical Therapy to address ROM and strength deficits.     Therapist: Amairani Quinones, PT  6/5/2019

## 2019-06-12 ENCOUNTER — CLINICAL SUPPORT (OUTPATIENT)
Dept: REHABILITATION | Facility: HOSPITAL | Age: 64
End: 2019-06-12
Attending: ORTHOPAEDIC SURGERY
Payer: COMMERCIAL

## 2019-06-12 DIAGNOSIS — M19.012 GLENOHUMERAL ARTHRITIS, LEFT: ICD-10-CM

## 2019-06-12 DIAGNOSIS — M25.612 DECREASED ROM OF LEFT SHOULDER: Primary | ICD-10-CM

## 2019-06-12 PROCEDURE — 97110 THERAPEUTIC EXERCISES: CPT | Mod: PN

## 2019-06-12 PROCEDURE — 97140 MANUAL THERAPY 1/> REGIONS: CPT | Mod: PN

## 2019-06-12 NOTE — PROGRESS NOTES
Physical Therapy Daily Note     Name: Gina Mackenzie  Clinic Number: [id number]  Diagnosis:   Encounter Diagnoses   Name Primary?    Decreased ROM of left shoulder Yes    Glenohumeral arthritis, left      Physician: Skyler Phoenix,   Precautions: standard  Visit #:  11 of 12  PTA Visit #: 0  Time In:  7:00  AM  Time Out:   7:50 AM     Subjective     Pt reports:  I'm doing really well. Gina saw MD last week, he was very pleased with her progress. Told her that he was fine with er continuing her PT if she and I thought it would be helpful    Pain Scale: Gina rates pain on a scale of 0-10 to be 2-3 (max) currently with daily activities     Objective     Gina received individual therapeutic exercises to develop strength and ROM for 20 minutes including:  Sl External Rotation - 3=4#  Prone row with ER - 3-4#  Prone Abduction 3-4#  Prone Scaption  3-4#  Prone Extension 4#  Prone Flexion 3 #  D1/D2 Patterns   Scapular Retraction  Standing ER with arm in 45-60 degrees abduction - theraband   2-way Wall push-ups    Gina received the following manual therapy techniques: Joint mobilizations and Soft tissue Mobilization were applied to the: left shoulder  for 25 minutes including:  Grade II to II GH glides into lateral, inferior and posterior planes; Sleeper stretch; posterior capsule stretch; Pectoralis major/minor MET to release restrictions followed by D2 extension with elbow flexed;   teres minor: contract/relax to improve GH movement.  Added/instructed patient in  PNF patterns to incorporate rotation into overhead shoulder motion. Cervical mobilization - general stretching as well as MET for levator and scalene;     The patient received the following direct contact modalities after being cleared for contraindications:     The patient received the following supervised modalities after being cleared for contradictions:     Written Home Exercises Provided: see  above - given written handouts of each exercise.  Instructed patient in Levator and scalene stretches with    Added corner stretching for ER and Pec mm.   Pt demo good understanding of the education provided. Gina demonstrated good return demonstration of activities.     Education provided re:  Gina verbalized good understanding of education provided.   No spiritual or educational barriers to learning provided    Assessment     Patient tolerated treatment well.  AROM movements have significantly improved. Still getting end range pain into flexion with rotation; but ROM much improved in all planes.  Still noting decreased strength in posterior/periscapular shoulder girdle that contributes to impingement in left  GH.  Feel Joelle would benefit from continued Skilled Physical Therapy Intervention to address strength and ROM deficits.    This is a 63 y.o. female referred to outpatient physical therapy and presents with a medical diagnosis of left shoulder adhesive capsulitis; RTC tear and demonstrates limitations as described in the problem list. Pt prognosis is Excellent. Pt will continue to benefit from skilled outpatient physical therapy to address the deficits listed in the problem list, provide pt/family education and to maximize pt's level of independence in the home and community environment.     Shoulder Range of Motion:Updated 6/5/2019      Left active Left Passive Right active  Right Passive   Flexion 152 > 165 156 > 170  170 170   Abduction 142 > 160 145 > 165 170 170   Extension 50 > 55 53 > 55 55 55   Ext. Rotation HBH to T1 >Spine of Scapula  70 > 85 HBH to [id number]   Int. Rotation Hand to posterior iliac crest > TUB to T10  54 > 85 TUB to T6  85         Upper Extremity Strength - updated 6/5/2019   (R) UE   (L) UE     Shoulder flexion: 5/5 Shoulder flexion: 5/5   Shoulder Abduction: 5/5 Shoulder abduction: 5/5   Shoulder ER 5/5 Shoulder ER 4/5 > 4+/5    Shoulder IR 5/5 Shoulder IR 5/5   Lower Trap  4+/5 > 4+/5 Lower Trap 4+/5 > 4+/5   Middle Trap 4+/5 > 4+/5 Middle Trap 4+/5 > 4+/5    Rhomboids 4+/5 > 4+/5 Rhomboids 4+/5 > 4+/5         Goals as follows:     Long Term Goals: 6 weeks - Updated LTG/POC: x 4 weeks: No goals achieved fully as of today, but progress noted towards all of them.   Pain: Decrease pain to 0/10 to allow for return of full Left shoulder function   Strength: Improve strength in left shoulder and periscapular muscles to 5/5 for improved shoulder stability  ROM: Improve ROM to 100% of normal limits   Functional scale: Improve score on UEFS to <5% limitation   Lifting: Lift 40 lbs to waist level, 20 lbs to shoulder level, 10 lbs to overhead without pain or compensation  Postures: Increase sitting and/or standing duration to 60 mins without pain   Transfers: Perform home care patient transfers without increased pain or limitation  Exercise: demonstrate independence with home exercise program to maintain gains made in therapy.            Plan     Continue with established Plan of Care towards PT goals.  ! X/week x 4 weeks     Therapist: Amairani Quinones, PT  6/12/2019

## 2019-06-19 ENCOUNTER — CLINICAL SUPPORT (OUTPATIENT)
Dept: REHABILITATION | Facility: HOSPITAL | Age: 64
End: 2019-06-19
Attending: ORTHOPAEDIC SURGERY
Payer: COMMERCIAL

## 2019-06-19 DIAGNOSIS — M25.612 DECREASED ROM OF LEFT SHOULDER: Primary | ICD-10-CM

## 2019-06-19 PROCEDURE — 97140 MANUAL THERAPY 1/> REGIONS: CPT | Mod: PN

## 2019-06-19 NOTE — PROGRESS NOTES
Physical Therapy Daily Note     Name: Gina Mackenzie  Clinic Number: [id number]  Diagnosis:   Encounter Diagnosis   Name Primary?    Decreased ROM of left shoulder Yes     Physician: Skyler Phoenix,   Precautions: standard  Visit #:  12 of 12  PTA Visit #: 0  Time In:  7:00  AM  Time Out:   7:45 AM     Subjective     Pt reports:  I'm doing really well; I just have a little pain at the nd of range of motion into max scaption and external rotation.   Pain Scale: Gina rates pain on a scale of 0-10 to be 1-2  (max) currently with daily activitie    Objective     Gina received individual therapeutic exercises to develop strength and ROM for 20 minutes including:  Sl External Rotation - 3=4#  Prone row with ER - 3-4#  Prone Abduction 3-4#  Prone Scaption  3-4#  Prone Extension 4#  Prone Flexion 3 #  D1/D2 Patterns   Scapular Retraction  Standing ER with arm in 45-60 degrees abduction - theraband   2-way Wall push-ups    Gina received the following manual therapy techniques: Joint mobilizations and Soft tissue Mobilization were applied to the: left shoulder  for 25 minutes including:  Grade II to II GH glides into lateral, inferior and posterior planes; Sleeper stretch; posterior capsule stretch; Pectoralis major/minor MET to release restrictions followed by D2 extension with elbow flexed;   teres minor: contract/relax to improve GH movement.  Added/instructed patient in  PNF patterns to incorporate rotation into overhead shoulder motion. Cervical mobilization - general stretching as well as MET for levator and scalene;     The patient received the following direct contact modalities after being cleared for contraindications:     The patient received the following supervised modalities after being cleared for contradictions:     Written Home Exercises Provided: see above - given written handouts of each exercise.  Instructed patient in Levator and scalene stretches  with    Added corner stretching for ER and Pec mm.   Pt demo good understanding of the education provided. Gina demonstrated good return demonstration of activities.     Education provided re:  Gina verbalized good understanding of education provided.   No spiritual or educational barriers to learning provided    Assessment     Patient tolerated treatment well. Instructed Gina in use of tennis ball for trigger point release in upper trap and posterior shoulder girdle - infraspinatous mm.  AROM movements have significantly improved. Still getting end range pain into flexion with rotation; but ROM much improved in all planes.  Still noting decreased strength in posterior/periscapular shoulder girdle that contributes to impingement in left  GH.  Feel Joelle would benefit from continued Skilled Physical Therapy Intervention to address strength and ROM deficits.    This is a 63 y.o. female referred to outpatient physical therapy and presents with a medical diagnosis of left shoulder adhesive capsulitis; RTC tear and demonstrates limitations as described in the problem list. Pt prognosis is Excellent. Pt will continue to benefit from skilled outpatient physical therapy to address the deficits listed in the problem list, provide pt/family education and to maximize pt's level of independence in the home and community environment.     Shoulder Range of Motion:Updated 6/19/2019       Left active Left Passive Right active  Right Passive   Flexion 152 > 165 156 > 170  170 170   Abduction 142 > 160 145 > 165 170 170   Extension 50 > 55 53 > 55 55 55   Ext. Rotation HBH to T1 >Spine of Scapula  70 > 85 HBH to [id number] 80   Int. Rotation Hand to posterior iliac crest > TUB to T 8 54 > 90 TUB to T6  85         Upper Extremity Strength - updated 6/19/2019   (R) UE   (L) UE     Shoulder flexion: 5/5 Shoulder flexion: 5/5   Shoulder Abduction: 5/5 Shoulder abduction: 5/5   Shoulder ER 5/5 Shoulder ER 4/5 > 4+/5    Shoulder IR  5/5 Shoulder IR 5/5   Lower Trap 4+/5 > 4+/5 Lower Trap 4+/5 > 4+/5   Middle Trap 4+/5 > 4+/5 Middle Trap 4+/5 > 4+/5    Rhomboids 4+/5 > 4+/5 Rhomboids 4+/5 > 4+/5         Goals as follows:     Long Term Goals: 6 weeks - Updated LTG/POC: x 4 weeks: No goals achieved fully as of today, but progress noted towards all of them.   Pain: Decrease pain to 0/10 to allow for return of full Left shoulder function   Strength: Improve strength in left shoulder and periscapular muscles to 5/5 for improved shoulder stability  ROM: Improve ROM to 100% of normal limits   Functional scale: Improve score on UEFS to <5% limitation   Lifting: Lift 40 lbs to waist level, 20 lbs to shoulder level, 10 lbs to overhead without pain or compensation  Postures: Increase sitting and/or standing duration to 60 mins without pain   Transfers: Perform home care patient transfers without increased pain or limitation  Exercise: demonstrate independence with home exercise program to maintain gains made in therapy.            Plan     Continue with established Plan of Care towards PT goals.  ! X/week x 4 weeks     Therapist: Amairani Quinones, PT  6/19/2019

## 2019-06-26 ENCOUNTER — CLINICAL SUPPORT (OUTPATIENT)
Dept: REHABILITATION | Facility: HOSPITAL | Age: 64
End: 2019-06-26
Attending: ORTHOPAEDIC SURGERY
Payer: COMMERCIAL

## 2019-06-26 DIAGNOSIS — M25.612 DECREASED ROM OF LEFT SHOULDER: Primary | ICD-10-CM

## 2019-06-26 PROCEDURE — 97110 THERAPEUTIC EXERCISES: CPT | Mod: PN

## 2019-06-26 PROCEDURE — 97140 MANUAL THERAPY 1/> REGIONS: CPT | Mod: PN

## 2019-07-01 ENCOUNTER — LAB VISIT (OUTPATIENT)
Dept: LAB | Facility: HOSPITAL | Age: 64
End: 2019-07-01
Attending: NURSE PRACTITIONER
Payer: COMMERCIAL

## 2019-07-01 DIAGNOSIS — L40.8 OTHER PSORIASIS: Primary | ICD-10-CM

## 2019-07-01 LAB
ALBUMIN SERPL BCP-MCNC: 4.5 G/DL (ref 3.5–5.2)
ALP SERPL-CCNC: 44 U/L (ref 55–135)
ALT SERPL W/O P-5'-P-CCNC: 14 U/L (ref 10–44)
ANION GAP SERPL CALC-SCNC: 11 MMOL/L (ref 8–16)
AST SERPL-CCNC: 19 U/L (ref 10–40)
BASOPHILS # BLD AUTO: 0.04 K/UL (ref 0–0.2)
BASOPHILS NFR BLD: 0.8 % (ref 0–1.9)
BILIRUB DIRECT SERPL-MCNC: 0.1 MG/DL (ref 0.1–0.3)
BILIRUB SERPL-MCNC: 0.7 MG/DL (ref 0.1–1)
BUN SERPL-MCNC: 18 MG/DL (ref 8–23)
CALCIUM SERPL-MCNC: 9.3 MG/DL (ref 8.7–10.5)
CHLORIDE SERPL-SCNC: 102 MMOL/L (ref 95–110)
CO2 SERPL-SCNC: 25 MMOL/L (ref 23–29)
CREAT SERPL-MCNC: 1.1 MG/DL (ref 0.5–1.4)
DIFFERENTIAL METHOD: ABNORMAL
EOSINOPHIL # BLD AUTO: 0.1 K/UL (ref 0–0.5)
EOSINOPHIL NFR BLD: 2.9 % (ref 0–8)
ERYTHROCYTE [DISTWIDTH] IN BLOOD BY AUTOMATED COUNT: 12.1 % (ref 11.5–14.5)
EST. GFR  (AFRICAN AMERICAN): >60 ML/MIN/1.73 M^2
EST. GFR  (NON AFRICAN AMERICAN): 53.2 ML/MIN/1.73 M^2
GLUCOSE SERPL-MCNC: 88 MG/DL (ref 70–110)
HCT VFR BLD AUTO: 42 % (ref 37–48.5)
HGB BLD-MCNC: 14 G/DL (ref 12–16)
IMM GRANULOCYTES # BLD AUTO: 0.01 K/UL (ref 0–0.04)
IMM GRANULOCYTES NFR BLD AUTO: 0.2 % (ref 0–0.5)
LYMPHOCYTES # BLD AUTO: 1.1 K/UL (ref 1–4.8)
LYMPHOCYTES NFR BLD: 22.1 % (ref 18–48)
MCH RBC QN AUTO: 31.9 PG (ref 27–31)
MCHC RBC AUTO-ENTMCNC: 33.3 G/DL (ref 32–36)
MCV RBC AUTO: 96 FL (ref 82–98)
MONOCYTES # BLD AUTO: 0.5 K/UL (ref 0.3–1)
MONOCYTES NFR BLD: 9.4 % (ref 4–15)
NEUTROPHILS # BLD AUTO: 3.2 K/UL (ref 1.8–7.7)
NEUTROPHILS NFR BLD: 64.6 % (ref 38–73)
NRBC BLD-RTO: 0 /100 WBC
PLATELET # BLD AUTO: 206 K/UL (ref 150–350)
PMV BLD AUTO: 10.4 FL (ref 9.2–12.9)
POTASSIUM SERPL-SCNC: 4.2 MMOL/L (ref 3.5–5.1)
PROT SERPL-MCNC: 7.8 G/DL (ref 6–8.4)
RBC # BLD AUTO: 4.39 M/UL (ref 4–5.4)
SODIUM SERPL-SCNC: 138 MMOL/L (ref 136–145)
WBC # BLD AUTO: 4.88 K/UL (ref 3.9–12.7)

## 2019-07-01 PROCEDURE — 80048 BASIC METABOLIC PNL TOTAL CA: CPT

## 2019-07-01 PROCEDURE — 36415 COLL VENOUS BLD VENIPUNCTURE: CPT

## 2019-07-01 PROCEDURE — 80076 HEPATIC FUNCTION PANEL: CPT

## 2019-07-01 PROCEDURE — 85025 COMPLETE CBC W/AUTO DIFF WBC: CPT

## 2019-07-03 ENCOUNTER — HOSPITAL ENCOUNTER (OUTPATIENT)
Dept: RADIOLOGY | Facility: HOSPITAL | Age: 64
Discharge: HOME | End: 2019-07-03
Attending: NURSE PRACTITIONER
Payer: COMMERCIAL

## 2019-07-03 VITALS — BODY MASS INDEX: 22.9 KG/M2 | WEIGHT: 160 LBS | HEIGHT: 70 IN

## 2019-07-03 DIAGNOSIS — R92.8 ABNORMAL MAMMOGRAM OF LEFT BREAST: ICD-10-CM

## 2019-07-03 DIAGNOSIS — Z78.0 POST-MENOPAUSAL: ICD-10-CM

## 2019-07-03 DIAGNOSIS — Z13.820 ENCOUNTER FOR SCREENING FOR OSTEOPOROSIS: ICD-10-CM

## 2019-07-03 DIAGNOSIS — Z87.891 HISTORY OF TOBACCO ABUSE: ICD-10-CM

## 2019-07-03 PROCEDURE — 77065 DX MAMMO INCL CAD UNI: CPT | Mod: TC,LT

## 2019-07-03 PROCEDURE — 77065 MAMMO DIGITAL DIAGNOSTIC LEFT WITH CAD: ICD-10-PCS | Mod: 26,LT,, | Performed by: RADIOLOGY

## 2019-07-03 PROCEDURE — 77080 DEXA BONE DENSITY SPINE HIP: ICD-10-PCS | Mod: 26,,, | Performed by: RADIOLOGY

## 2019-07-03 PROCEDURE — 77080 DXA BONE DENSITY AXIAL: CPT | Mod: 26,,, | Performed by: RADIOLOGY

## 2019-07-03 PROCEDURE — 77080 DXA BONE DENSITY AXIAL: CPT | Mod: TC

## 2019-07-03 PROCEDURE — 77065 DX MAMMO INCL CAD UNI: CPT | Mod: 26,LT,, | Performed by: RADIOLOGY

## 2019-07-08 ENCOUNTER — LAB VISIT (OUTPATIENT)
Dept: LAB | Facility: HOSPITAL | Age: 64
End: 2019-07-08
Attending: NURSE PRACTITIONER
Payer: MEDICARE

## 2019-07-08 DIAGNOSIS — L40.8 OTHER PSORIASIS: Primary | ICD-10-CM

## 2019-07-08 LAB
ALBUMIN SERPL BCP-MCNC: 4 G/DL (ref 3.5–5.2)
ALP SERPL-CCNC: 40 U/L (ref 55–135)
ALT SERPL W/O P-5'-P-CCNC: 15 U/L (ref 10–44)
ANION GAP SERPL CALC-SCNC: 8 MMOL/L (ref 8–16)
AST SERPL-CCNC: 22 U/L (ref 10–40)
BASOPHILS # BLD AUTO: 0.03 K/UL (ref 0–0.2)
BASOPHILS NFR BLD: 0.8 % (ref 0–1.9)
BILIRUB DIRECT SERPL-MCNC: 0.1 MG/DL (ref 0.1–0.3)
BILIRUB SERPL-MCNC: 0.8 MG/DL (ref 0.1–1)
BUN SERPL-MCNC: 15 MG/DL (ref 8–23)
CALCIUM SERPL-MCNC: 8.9 MG/DL (ref 8.7–10.5)
CHLORIDE SERPL-SCNC: 102 MMOL/L (ref 95–110)
CO2 SERPL-SCNC: 23 MMOL/L (ref 23–29)
CREAT SERPL-MCNC: 1 MG/DL (ref 0.5–1.4)
DIFFERENTIAL METHOD: ABNORMAL
EOSINOPHIL # BLD AUTO: 0.1 K/UL (ref 0–0.5)
EOSINOPHIL NFR BLD: 3.3 % (ref 0–8)
ERYTHROCYTE [DISTWIDTH] IN BLOOD BY AUTOMATED COUNT: 11.9 % (ref 11.5–14.5)
EST. GFR  (AFRICAN AMERICAN): >60 ML/MIN/1.73 M^2
EST. GFR  (NON AFRICAN AMERICAN): 59.7 ML/MIN/1.73 M^2
GLUCOSE SERPL-MCNC: 124 MG/DL (ref 70–110)
HCT VFR BLD AUTO: 41.4 % (ref 37–48.5)
HGB BLD-MCNC: 13.8 G/DL (ref 12–16)
IMM GRANULOCYTES # BLD AUTO: 0 K/UL (ref 0–0.04)
IMM GRANULOCYTES NFR BLD AUTO: 0 % (ref 0–0.5)
LYMPHOCYTES # BLD AUTO: 0.8 K/UL (ref 1–4.8)
LYMPHOCYTES NFR BLD: 21.3 % (ref 18–48)
MCH RBC QN AUTO: 31.4 PG (ref 27–31)
MCHC RBC AUTO-ENTMCNC: 33.3 G/DL (ref 32–36)
MCV RBC AUTO: 94 FL (ref 82–98)
MONOCYTES # BLD AUTO: 0.4 K/UL (ref 0.3–1)
MONOCYTES NFR BLD: 10 % (ref 4–15)
NEUTROPHILS # BLD AUTO: 2.3 K/UL (ref 1.8–7.7)
NEUTROPHILS NFR BLD: 64.6 % (ref 38–73)
NRBC BLD-RTO: 0 /100 WBC
PLATELET # BLD AUTO: 183 K/UL (ref 150–350)
PMV BLD AUTO: 10.8 FL (ref 9.2–12.9)
POTASSIUM SERPL-SCNC: 4.3 MMOL/L (ref 3.5–5.1)
PROT SERPL-MCNC: 7.1 G/DL (ref 6–8.4)
RBC # BLD AUTO: 4.4 M/UL (ref 4–5.4)
SODIUM SERPL-SCNC: 133 MMOL/L (ref 136–145)
WBC # BLD AUTO: 3.61 K/UL (ref 3.9–12.7)

## 2019-07-08 PROCEDURE — 85025 COMPLETE CBC W/AUTO DIFF WBC: CPT

## 2019-07-08 PROCEDURE — 80076 HEPATIC FUNCTION PANEL: CPT

## 2019-07-08 PROCEDURE — 80048 BASIC METABOLIC PNL TOTAL CA: CPT

## 2019-07-08 PROCEDURE — 36415 COLL VENOUS BLD VENIPUNCTURE: CPT

## 2019-07-18 PROBLEM — B35.3 TINEA PEDIS, LEFT: Status: RESOLVED | Noted: 2019-04-15 | Resolved: 2019-07-18

## 2019-11-01 ENCOUNTER — TELEPHONE (OUTPATIENT)
Dept: HEMATOLOGY/ONCOLOGY | Facility: CLINIC | Age: 64
End: 2019-11-01

## 2019-11-01 PROBLEM — R76.8 POSITIVE ANA (ANTINUCLEAR ANTIBODY): Status: ACTIVE | Noted: 2019-11-01

## 2019-11-01 PROBLEM — D72.819 LEUKOPENIA: Status: ACTIVE | Noted: 2019-11-01

## 2019-11-01 PROBLEM — L40.1 PUSTULAR PSORIASIS: Status: ACTIVE | Noted: 2019-11-01

## 2019-11-01 NOTE — TELEPHONE ENCOUNTER
----- Message from Toby Rosa sent at 11/1/2019 12:18 PM CDT -----  Contact: Patient  Type: Needs Medical Advice    Who Called:  Patient  Best Call Back Number: [phone]  Additional Information: Patient would like to schedule new patient appointment from referral. Please call to advise. Thanks!

## 2019-11-08 ENCOUNTER — OFFICE VISIT (OUTPATIENT)
Dept: HEMATOLOGY/ONCOLOGY | Facility: CLINIC | Age: 64
End: 2019-11-08
Payer: MEDICARE

## 2019-11-08 VITALS
SYSTOLIC BLOOD PRESSURE: 116 MMHG | DIASTOLIC BLOOD PRESSURE: 67 MMHG | RESPIRATION RATE: 13 BRPM | TEMPERATURE: 98 F | HEIGHT: 70 IN | WEIGHT: 159.19 LBS | BODY MASS INDEX: 22.79 KG/M2

## 2019-11-08 DIAGNOSIS — R76.8 POSITIVE ANA (ANTINUCLEAR ANTIBODY): ICD-10-CM

## 2019-11-08 DIAGNOSIS — D72.819 LEUKOPENIA, UNSPECIFIED TYPE: Primary | ICD-10-CM

## 2019-11-08 DIAGNOSIS — R21 RASH: ICD-10-CM

## 2019-11-08 PROCEDURE — 3008F PR BODY MASS INDEX (BMI) DOCUMENTED: ICD-10-PCS | Mod: CPTII,S$GLB,, | Performed by: INTERNAL MEDICINE

## 2019-11-08 PROCEDURE — 1101F PT FALLS ASSESS-DOCD LE1/YR: CPT | Mod: CPTII,S$GLB,, | Performed by: INTERNAL MEDICINE

## 2019-11-08 PROCEDURE — 99999 PR PBB SHADOW E&M-EST. PATIENT-LVL III: CPT | Mod: PBBFAC,,, | Performed by: INTERNAL MEDICINE

## 2019-11-08 PROCEDURE — 3008F BODY MASS INDEX DOCD: CPT | Mod: CPTII,S$GLB,, | Performed by: INTERNAL MEDICINE

## 2019-11-08 PROCEDURE — 99999 PR PBB SHADOW E&M-EST. PATIENT-LVL III: ICD-10-PCS | Mod: PBBFAC,,, | Performed by: INTERNAL MEDICINE

## 2019-11-08 PROCEDURE — 1101F PR PT FALLS ASSESS DOC 0-1 FALLS W/OUT INJ PAST YR: ICD-10-PCS | Mod: CPTII,S$GLB,, | Performed by: INTERNAL MEDICINE

## 2019-11-08 PROCEDURE — 3288F FALL RISK ASSESSMENT DOCD: CPT | Mod: CPTII,S$GLB,, | Performed by: INTERNAL MEDICINE

## 2019-11-08 PROCEDURE — 99204 OFFICE O/P NEW MOD 45 MIN: CPT | Mod: S$GLB,,, | Performed by: INTERNAL MEDICINE

## 2019-11-08 PROCEDURE — 99204 PR OFFICE/OUTPT VISIT, NEW, LEVL IV, 45-59 MIN: ICD-10-PCS | Mod: S$GLB,,, | Performed by: INTERNAL MEDICINE

## 2019-11-08 PROCEDURE — 3288F PR FALLS RISK ASSESSMENT DOCUMENTED: ICD-10-PCS | Mod: CPTII,S$GLB,, | Performed by: INTERNAL MEDICINE

## 2019-11-08 NOTE — PROGRESS NOTES
Other (Consult for leukopenia )      Gina Mackenzie is a 64 y.o. patient referred for evaluation of leukopenia  Pt has psoriasis proven by a biopsy with derm. She was also found to have a specled alfred titer . She started methotrexate and is here for leukopenia.   She does not have a history of recurring infections. Latelet she had a tooth that was infected and she was on antibiotics for months .     Past Medical History:   Diagnosis Date    Hypothyroidism     Malignant neoplasm of skin     Non-melanoma skin cancer    Squamous cell carcinoma in situ of skin of left lower leg     Wellness examination      Past Surgical History:   Procedure Laterality Date    AUGMENTATION OF BREAST       SECTION      COLONOSCOPY  2015    hpp    MALIGNANT SKIN LESION EXCISION  2016       Current Outpatient Medications:     buPROPion (WELLBUTRIN) 100 MG tablet, Take 1 tablet (100 mg total) by mouth once daily., Disp: 90 tablet, Rfl: 1    estradiol (ESTRACE) 0.5 MG tablet, Take 1 tablet (0.5 mg total) by mouth once daily., Disp: 90 tablet, Rfl: 1    folic acid (FOLVITE) 1 MG tablet, Take 1 mg by mouth once daily., Disp: , Rfl:     levothyroxine (SYNTHROID) 100 MCG tablet, Take 1 tablet (100 mcg total) by mouth before breakfast., Disp: 90 tablet, Rfl: 1    medroxyPROGESTERone (PROVERA) 2.5 MG tablet, Take 1 tablet (2.5 mg total) by mouth once daily., Disp: 90 tablet, Rfl: 1    methotrexate 2.5 MG Tab, Take 15 mg by mouth every 7 days., Disp: , Rfl:   Review of patient's allergies indicates:   Allergen Reactions    Codeine Hives and Swelling     Social History     Tobacco Use    Smoking status: Former Smoker     Types: Cigarettes     Last attempt to quit: 2013     Years since quittin.8    Smokeless tobacco: Never Used   Substance Use Topics    Alcohol use: No    Drug use: No     Family History   Problem Relation Age of Onset    Heart failure Father     Hypertension Father     Stroke  "Sister     Stroke Brother     Pancreatic cancer Maternal Grandmother     Breast cancer Neg Hx        CONSTITUTIONAL: No fevers, chills, night sweats, wt. loss, appetite changes  SKIN: no rashes or itching  ENT: No headaches, head trauma, vision changes, or eye pain  LYMPH NODES: None noticed   CV: No chest pain, palpitations.   RESP: No dyspnea on exertion, cough, wheezing, or hemoptysis  GI: No nausea, emesis, diarrhea, constipation, melena, hematochezia, pain.   : No dysuria, hematuria, urgency, or frequency   HEME: No easy bruising, bleeding problems  PSYCHIATRIC: No depression, anxiety, psychosis, hallucinations.  NEURO: No seizures, memory loss, dizziness or difficulty sleeping  MSK: No arthralgias or joint swelling         /67   Temp 98.1 °F (36.7 °C)   Resp 13   Ht 5' 10" (1.778 m)   Wt 72.2 kg (159 lb 2.8 oz)   BMI 22.84 kg/m²   Gen: NAD, A and O x3,   Psych: pleasant affect, normal thought process  Eyes: Pupils round and non dilated, EOM intact  Nose: Nares patent  OP clear, mucosa patent  Neck: suppple, no JVD, trachea midline, no palpable mass, no adenopathy  Lungs: CTAB, no wheezes, no use of accessory muscles  CV: S1S2 with RRR, No mrg  Abd: soft, NTND, + BS, No HSM, no ascites  Extr: No CCE, STEPHANIE, strength normal, good capillary refill  Neuro: steady gait, CNs grossly intact  Skin: intact, no lesions noted  Rheum: No joint swelling    Lab Results   Component Value Date    WBC 3.3 (L) 10/04/2019    HGB 14.1 10/04/2019    HCT 42.4 10/04/2019    MCV 92.0 10/04/2019     10/04/2019         CMP  Sodium   Date Value Ref Range Status   07/11/2019 138 135 - 146 mmol/L Final     Potassium   Date Value Ref Range Status   07/11/2019 5.1 3.5 - 5.3 mmol/L Final     Chloride   Date Value Ref Range Status   07/11/2019 101 98 - 110 mmol/L Final     CO2   Date Value Ref Range Status   07/11/2019 27 20 - 32 mmol/L Final     Glucose   Date Value Ref Range Status   07/11/2019 86 65 - 139 mg/dL Final "     Comment:               Non-fasting reference interval          BUN, Bld   Date Value Ref Range Status   07/11/2019 15 7 - 25 mg/dL Final     Creatinine   Date Value Ref Range Status   07/11/2019 1.07 (H) 0.50 - 0.99 mg/dL Final     Comment:     For patients >49 years of age, the reference limit  for Creatinine is approximately 13% higher for people  identified as -American.          Calcium   Date Value Ref Range Status   07/11/2019 9.2 8.6 - 10.4 mg/dL Final     Total Protein   Date Value Ref Range Status   07/11/2019 6.9 6.1 - 8.1 g/dL Final     Albumin   Date Value Ref Range Status   07/11/2019 4.2 3.6 - 5.1 g/dL Final     Total Bilirubin   Date Value Ref Range Status   07/11/2019 0.6 0.2 - 1.2 mg/dL Final     Alkaline Phosphatase   Date Value Ref Range Status   07/11/2019 51 33 - 130 U/L Final     AST   Date Value Ref Range Status   07/11/2019 18 10 - 35 U/L Final     ALT   Date Value Ref Range Status   07/11/2019 14 6 - 29 U/L Final     Anion Gap   Date Value Ref Range Status   07/08/2019 8 8 - 16 mmol/L Final     eGFR if    Date Value Ref Range Status   07/11/2019 64 > OR = 60 mL/min/1.73m2 Final     eGFR if non    Date Value Ref Range Status   07/11/2019 55 (L) > OR = 60 mL/min/1.73m2 Final       Leukopenia, unspecified type  -     Vitamin B1; Future; Expected date: 11/08/2019  -     Vitamin B6; Future; Expected date: 11/08/2019  -     Zinc; Future; Expected date: 11/08/2019  -     Copper, serum; Future; Expected date: 11/08/2019  -     Misty-Barr Virus (EBV) antibody panel; Future; Expected date: 11/08/2019  -     Cytomegalovirus antibody, IgG; Future; Expected date: 11/08/2019  -     Cytomegalovirus (Cmv) Ab, Igm; Future; Expected date: 11/08/2019    Positive PATRICE (antinuclear antibody)    Rash        Leukopenia likely medication related  will check viral titers and nutritional factors and call her with results   She does not need f/u with heme but does need  someone watching her counts monthly if she is to stay on immunosuppressant therapy  SHe should see a rheumatologist  for routine follow-up and for monitoring while on methotrexate  She is to continue folic acid replacement which will help decrease toxicity from methotrexate.  She should continue Synthroid for thyroid disease in follow-up with her primary care usual basis I did explain that she should not stop Wellbutrin abruptly to the possibility of withdrawal some    Current Outpatient Medications:     buPROPion (WELLBUTRIN) 100 MG tablet, Take 1 tablet (100 mg total) by mouth once daily., Disp: 90 tablet, Rfl: 1    estradiol (ESTRACE) 0.5 MG tablet, Take 1 tablet (0.5 mg total) by mouth once daily., Disp: 90 tablet, Rfl: 1    folic acid (FOLVITE) 1 MG tablet, Take 1 mg by mouth once daily., Disp: , Rfl:     levothyroxine (SYNTHROID) 100 MCG tablet, Take 1 tablet (100 mcg total) by mouth before breakfast., Disp: 90 tablet, Rfl: 1    medroxyPROGESTERone (PROVERA) 2.5 MG tablet, Take 1 tablet (2.5 mg total) by mouth once daily., Disp: 90 tablet, Rfl: 1    methotrexate 2.5 MG Tab, Take 15 mg by mouth every 7 days., Disp: , Rfl:     Thank you for allowing me to evaluate and participate in the care of this pleasant patient. Please fell free to call me with any questions or concerns.    Zoë Rubio MD    This note was dictated with Dragon and briefly proofread. Please excuse any sentences that may be unclear or words misspelled

## 2019-11-08 NOTE — LETTER
November 15, 2019      Lisbeth Krishnan, ALAN-C  [address] [place] [address]           [facility]  [address]  [facility] [address]  Phone: [phone]          Patient: Gina Mackenzie   MR Number: [id number]   YOB: 1955   Date of Visit: 11/8/2019       Dear Lisbeth Krishnan:    Thank you for referring Gina Mackenzie to me for evaluation. Attached you will find relevant portions of my assessment and plan of care.    If you have questions, please do not hesitate to call me. I look forward to following Gina Mackenzie along with you.    Sincerely,    Zoë Ahmadi MD    Enclosure  CC:  No Recipients    If you would like to receive this communication electronically, please contact [email or web address] or [phone] to request more information on [facility] Link access.    For providers and/or their staff who would like to refer a patient to [facility], please contact us through our one-stop-shop provider referral line, [facility], at [phone].    If you feel you have received this communication in error or would no longer like to receive these types of communications, please e-mail [email or web address]

## 2019-11-13 ENCOUNTER — LAB VISIT (OUTPATIENT)
Dept: LAB | Facility: HOSPITAL | Age: 64
End: 2019-11-13
Attending: INTERNAL MEDICINE
Payer: MEDICARE

## 2019-11-13 DIAGNOSIS — D72.819 LEUKOPENIA, UNSPECIFIED TYPE: ICD-10-CM

## 2019-11-13 PROCEDURE — 84207 ASSAY OF VITAMIN B-6: CPT

## 2019-11-13 PROCEDURE — 86644 CMV ANTIBODY: CPT

## 2019-11-13 PROCEDURE — 84425 ASSAY OF VITAMIN B-1: CPT

## 2019-11-13 PROCEDURE — 84630 ASSAY OF ZINC: CPT

## 2019-11-13 PROCEDURE — 86665 EPSTEIN-BARR CAPSID VCA: CPT | Mod: 59

## 2019-11-13 PROCEDURE — 86645 CMV ANTIBODY IGM: CPT

## 2019-11-15 LAB
CMV IGG SERPL QL IA: REACTIVE
CMV IGM SERPL IA-ACNC: <8 AU/ML
EBV EA IGG SER-ACNC: 32.1 U/ML
EBV NA IGG SER-ACNC: 420 U/ML
EBV VCA IGG SER-ACNC: 747 U/ML
EBV VCA IGM SER-ACNC: 63.7 U/ML
ZINC SERPL-MCNC: 88 UG/DL (ref 60–130)

## 2019-11-16 LAB — VIT B1 BLD-MCNC: 43 UG/L (ref 38–122)

## 2019-11-18 LAB — PYRIDOXAL SERPL-MCNC: 17 UG/L (ref 5–50)

## 2019-11-21 ENCOUNTER — TELEPHONE (OUTPATIENT)
Dept: HEMATOLOGY/ONCOLOGY | Facility: CLINIC | Age: 64
End: 2019-11-21

## 2019-11-21 NOTE — TELEPHONE ENCOUNTER
Dr Ahmadi reviewed lab results. EBV and CMV were positive. Advised per Dr Ahmadi that these are viruses that many people have and she should increase her Vit C intake.       ----- Message from Jose Eduardo Schwartz sent at 11/21/2019  2:21 PM CST -----  Contact: same  Patient called in and wanted to check the status of her lab results that were done on 11/13/19.  Patient actually has some question on some of the results.  Call back number is [phone]

## 2019-12-05 DIAGNOSIS — M25.512 LEFT SHOULDER PAIN, UNSPECIFIED CHRONICITY: Primary | ICD-10-CM

## 2019-12-13 ENCOUNTER — HOSPITAL ENCOUNTER (OUTPATIENT)
Dept: RADIOLOGY | Facility: HOSPITAL | Age: 64
Discharge: HOME | End: 2019-12-13
Attending: ORTHOPAEDIC SURGERY
Payer: MEDICARE

## 2019-12-13 ENCOUNTER — OFFICE VISIT (OUTPATIENT)
Dept: ORTHOPEDICS | Facility: CLINIC | Age: 64
End: 2019-12-13
Payer: MEDICARE

## 2019-12-13 VITALS
WEIGHT: 159 LBS | BODY MASS INDEX: 22.76 KG/M2 | HEART RATE: 67 BPM | DIASTOLIC BLOOD PRESSURE: 71 MMHG | HEIGHT: 70 IN | SYSTOLIC BLOOD PRESSURE: 115 MMHG

## 2019-12-13 DIAGNOSIS — M19.012 ARTHRITIS OF LEFT ACROMIOCLAVICULAR JOINT: ICD-10-CM

## 2019-12-13 DIAGNOSIS — M75.22 BICEPS TENDONITIS, LEFT: Primary | ICD-10-CM

## 2019-12-13 DIAGNOSIS — M19.012 GLENOHUMERAL ARTHRITIS, LEFT: ICD-10-CM

## 2019-12-13 DIAGNOSIS — M25.512 LEFT SHOULDER PAIN, UNSPECIFIED CHRONICITY: ICD-10-CM

## 2019-12-13 PROCEDURE — 99999 PR PBB SHADOW E&M-EST. PATIENT-LVL III: CPT | Mod: PBBFAC,,, | Performed by: ORTHOPAEDIC SURGERY

## 2019-12-13 PROCEDURE — 3008F BODY MASS INDEX DOCD: CPT | Mod: CPTII,S$GLB,, | Performed by: ORTHOPAEDIC SURGERY

## 2019-12-13 PROCEDURE — 99213 PR OFFICE/OUTPT VISIT, EST, LEVL III, 20-29 MIN: ICD-10-PCS | Mod: 25,S$GLB,, | Performed by: ORTHOPAEDIC SURGERY

## 2019-12-13 PROCEDURE — 73030 X-RAY EXAM OF SHOULDER: CPT | Mod: TC,PN,LT

## 2019-12-13 PROCEDURE — 20610 LARGE JOINT ASPIRATION/INJECTION: L GLENOHUMERAL: ICD-10-PCS | Mod: LT,S$GLB,, | Performed by: ORTHOPAEDIC SURGERY

## 2019-12-13 PROCEDURE — 1101F PT FALLS ASSESS-DOCD LE1/YR: CPT | Mod: CPTII,S$GLB,, | Performed by: ORTHOPAEDIC SURGERY

## 2019-12-13 PROCEDURE — 1101F PR PT FALLS ASSESS DOC 0-1 FALLS W/OUT INJ PAST YR: ICD-10-PCS | Mod: CPTII,S$GLB,, | Performed by: ORTHOPAEDIC SURGERY

## 2019-12-13 PROCEDURE — 73030 XR SHOULDER COMPLETE 2 OR MORE VIEWS LEFT: ICD-10-PCS | Mod: 26,LT,, | Performed by: RADIOLOGY

## 2019-12-13 PROCEDURE — 73030 X-RAY EXAM OF SHOULDER: CPT | Mod: 26,LT,, | Performed by: RADIOLOGY

## 2019-12-13 PROCEDURE — 99999 PR PBB SHADOW E&M-EST. PATIENT-LVL III: ICD-10-PCS | Mod: PBBFAC,,, | Performed by: ORTHOPAEDIC SURGERY

## 2019-12-13 PROCEDURE — 3008F PR BODY MASS INDEX (BMI) DOCUMENTED: ICD-10-PCS | Mod: CPTII,S$GLB,, | Performed by: ORTHOPAEDIC SURGERY

## 2019-12-13 PROCEDURE — 99213 OFFICE O/P EST LOW 20 MIN: CPT | Mod: 25,S$GLB,, | Performed by: ORTHOPAEDIC SURGERY

## 2019-12-13 PROCEDURE — 20610 DRAIN/INJ JOINT/BURSA W/O US: CPT | Mod: LT,S$GLB,, | Performed by: ORTHOPAEDIC SURGERY

## 2019-12-13 RX ORDER — TRIAMCINOLONE ACETONIDE 40 MG/ML
40 INJECTION, SUSPENSION INTRA-ARTICULAR; INTRAMUSCULAR
Status: DISCONTINUED | OUTPATIENT
Start: 2019-12-13 | End: 2019-12-13 | Stop reason: HOSPADM

## 2019-12-13 RX ADMIN — TRIAMCINOLONE ACETONIDE 40 MG: 40 INJECTION, SUSPENSION INTRA-ARTICULAR; INTRAMUSCULAR at 01:12

## 2019-12-13 NOTE — PROCEDURES
Large Joint Aspiration/Injection: L glenohumeral  Date/Time: 12/13/2019 1:30 PM  Performed by: Skyler Phoenix DO  Authorized by: Skyler Phoenix, DO     Consent Done?:  Yes (Verbal)  Indications:  Pain and diagnostic evaluation  Procedure site marked: Yes    Timeout: Prior to procedure the correct patient, procedure, and site was verified      Location:  Shoulder  Site:  L glenohumeral  Prep: Patient was prepped and draped in usual sterile fashion    Needle size:  22 G  Ultrasonic Guidance for needle placement: No  Approach:  Posterior  Medications:  40 mg triamcinolone acetonide 40 mg/mL  Patient tolerance:  Patient tolerated the procedure well with no immediate complications

## 2019-12-13 NOTE — PROGRESS NOTES
Subjective:      Patient ID: Gina Mackenzie is a 64 y.o. female.    Chief Complaint: Shoulder Pain (Left shoulder pain )      HPI: Ms. Mackenzie returns today with complaints of recurrent pain in her left shoulder.  She was given a steroid injection 104/24/2019 and had good resolution of her pain until approximately 1 month ago.  She stated the pain is different than previously were now is in front of her shoulder rather than lateral and posterior.  She has taken Motrin with help.  Motion increases her symptoms especially forward flexing her shoulder.  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    ROS:  No new diagnosis/surgery/prescriptions since last office visit on 06/05/2019.  Constitution: Negative for chills and fever.   HENT: Negative for congestion.    Eyes: Negative for blurred vision.   Cardiovascular: Negative for chest pain.   Respiratory: Negative for cough.    Endocrine: Negative for polydipsia.   Hematologic/Lymphatic: Does not bruise/bleed easily.   Skin: Positive for skin cancer.   Musculoskeletal: Positive for joint pain. Negative for gout.   Gastrointestinal: Negative for constipation, diarrhea and heartburn.   Genitourinary: Negative for nocturia.   Neurological: Negative for headaches and seizures.   Psychiatric/Behavioral: Negative for depression.   Allergic/Immunologic: Negative for environmental allergies.         Objective:      Physical Exam:   General: AAOx3.  No acute distress  Vascular:  Pulses intact and equal bilaterally.  Capillary refill less than 3 seconds and equal bilaterally  Neurologic:  Pinprick and soft touch intact and equal bilaterally  Integment:  No ecchymosis, no errythema  Extremity:  Shoulder:  Forward flexion/abduction equal bilaterally 0/175 degrees. Internal rotation equal bilaterally T5.  External rotation equal bilaterally 0/23 degrees. Negative drop-arm bilaterally. Negative lift-off bilaterally. Full can with mild tenderness left shoulder.   Empty can negative both shoulders.  Hubbard/Neer negative bilaterally.  Cross-arm negative bilaterally.  Nontender over the AC joint bilaterally.  Tender in the bicipital groove left shoulder.  Tender with biceps activation at the bicipital groove left shoulder.  Yergason's negative bilaterally.  Apprehension/relocation negative bilaterally.  Radiography:  Personally reviewed x-rays of the left shoulder completed on 12/13/2019 showed showed AC and glenohumeral arthritis no fracture or dislocation      Assessment:       Impression:   1.  Biceps tendonitis, left shoulder.  2.  AC arthritis, left shoulder.  3.  Glenohumeral arthritis, left shoulder.      Plan:       1.  Discussed physical examination and radiographic findings with the patient. Gina understands that she has resolved the rotator cuff issues but now has biceps tendinitis and with conservative management she should improve.  2.  Offered a steroid injection to the left shoulder, she elected proceed.  3.  Continue with home exercises previously shown.  4.  Offered referred to physical therapy she declined.  5.  Discussed ergonomic changes to the way she lifts items.  6.  Take NSAIDs as tolerated allowed by PCM.  7.  [facility] portal was discussed with the patient and information was given.  The patient was encouraged to use the portal for future encounters.  8.  Follow up p.r.n..

## 2020-03-04 ENCOUNTER — LAB VISIT (OUTPATIENT)
Dept: LAB | Facility: HOSPITAL | Age: 65
End: 2020-03-04
Attending: NURSE PRACTITIONER
Payer: MEDICARE

## 2020-03-04 DIAGNOSIS — L40.8 OTHER PSORIASIS: Primary | ICD-10-CM

## 2020-03-04 LAB
ALBUMIN SERPL BCP-MCNC: 4 G/DL (ref 3.5–5.2)
ALP SERPL-CCNC: 38 U/L (ref 55–135)
ALT SERPL W/O P-5'-P-CCNC: 22 U/L (ref 10–44)
AST SERPL-CCNC: 26 U/L (ref 10–40)
BASOPHILS # BLD AUTO: 0.04 K/UL (ref 0–0.2)
BASOPHILS NFR BLD: 1.2 % (ref 0–1.9)
BILIRUB DIRECT SERPL-MCNC: 0.1 MG/DL (ref 0.1–0.3)
BILIRUB SERPL-MCNC: 0.6 MG/DL (ref 0.1–1)
DIFFERENTIAL METHOD: ABNORMAL
EOSINOPHIL # BLD AUTO: 0.1 K/UL (ref 0–0.5)
EOSINOPHIL NFR BLD: 3.6 % (ref 0–8)
ERYTHROCYTE [DISTWIDTH] IN BLOOD BY AUTOMATED COUNT: 13.2 % (ref 11.5–14.5)
HCT VFR BLD AUTO: 40.7 % (ref 37–48.5)
HGB BLD-MCNC: 13.2 G/DL (ref 12–16)
IMM GRANULOCYTES # BLD AUTO: 0.01 K/UL (ref 0–0.04)
IMM GRANULOCYTES NFR BLD AUTO: 0.3 % (ref 0–0.5)
LYMPHOCYTES # BLD AUTO: 1.2 K/UL (ref 1–4.8)
LYMPHOCYTES NFR BLD: 37.6 % (ref 18–48)
MCH RBC QN AUTO: 31.4 PG (ref 27–31)
MCHC RBC AUTO-ENTMCNC: 32.4 G/DL (ref 32–36)
MCV RBC AUTO: 97 FL (ref 82–98)
MONOCYTES # BLD AUTO: 0.4 K/UL (ref 0.3–1)
MONOCYTES NFR BLD: 11.8 % (ref 4–15)
NEUTROPHILS # BLD AUTO: 1.5 K/UL (ref 1.8–7.7)
NEUTROPHILS NFR BLD: 45.5 % (ref 38–73)
NRBC BLD-RTO: 0 /100 WBC
PLATELET # BLD AUTO: 214 K/UL (ref 150–350)
PMV BLD AUTO: 10.5 FL (ref 9.2–12.9)
PROT SERPL-MCNC: 7.1 G/DL (ref 6–8.4)
RBC # BLD AUTO: 4.21 M/UL (ref 4–5.4)
WBC # BLD AUTO: 3.3 K/UL (ref 3.9–12.7)

## 2020-03-04 PROCEDURE — 80076 HEPATIC FUNCTION PANEL: CPT

## 2020-03-04 PROCEDURE — 85025 COMPLETE CBC W/AUTO DIFF WBC: CPT

## 2020-03-04 PROCEDURE — 36415 COLL VENOUS BLD VENIPUNCTURE: CPT

## 2020-05-15 ENCOUNTER — APPOINTMENT (OUTPATIENT)
Dept: LAB | Facility: HOSPITAL | Age: 65
End: 2020-05-15
Attending: NURSE PRACTITIONER
Payer: MEDICARE

## 2020-07-09 ENCOUNTER — TELEPHONE (OUTPATIENT)
Dept: DERMATOLOGY | Facility: CLINIC | Age: 65
End: 2020-07-09

## 2020-07-09 NOTE — TELEPHONE ENCOUNTER
----- Message from Dylon Dowling sent at 7/9/2020  3:29 PM CDT -----  Regarding: pt  Type:  Patient Returning Call    Who Called:  pt  Who Left Message for Patient:  office  Does the patient know what this is regarding?:  no  Best Call Back Number:  [phone]   Additional Information:

## 2020-07-28 ENCOUNTER — OFFICE VISIT (OUTPATIENT)
Dept: DERMATOLOGY | Facility: CLINIC | Age: 65
End: 2020-07-28
Payer: MEDICARE

## 2020-07-28 DIAGNOSIS — L40.1 PUSTULAR PSORIASIS: Primary | ICD-10-CM

## 2020-07-28 PROCEDURE — 99999 PR PBB SHADOW E&M-EST. PATIENT-LVL III: CPT | Mod: PBBFAC,,, | Performed by: DERMATOLOGY

## 2020-07-28 PROCEDURE — 99999 PR PBB SHADOW E&M-EST. PATIENT-LVL III: ICD-10-PCS | Mod: PBBFAC,,, | Performed by: DERMATOLOGY

## 2020-07-28 PROCEDURE — 1101F PT FALLS ASSESS-DOCD LE1/YR: CPT | Mod: CPTII,S$GLB,, | Performed by: DERMATOLOGY

## 2020-07-28 PROCEDURE — 1159F MED LIST DOCD IN RCRD: CPT | Mod: S$GLB,,, | Performed by: DERMATOLOGY

## 2020-07-28 PROCEDURE — 99202 PR OFFICE/OUTPT VISIT, NEW, LEVL II, 15-29 MIN: ICD-10-PCS | Mod: S$GLB,,, | Performed by: DERMATOLOGY

## 2020-07-28 PROCEDURE — 1101F PR PT FALLS ASSESS DOC 0-1 FALLS W/OUT INJ PAST YR: ICD-10-PCS | Mod: CPTII,S$GLB,, | Performed by: DERMATOLOGY

## 2020-07-28 PROCEDURE — 99202 OFFICE O/P NEW SF 15 MIN: CPT | Mod: S$GLB,,, | Performed by: DERMATOLOGY

## 2020-07-28 PROCEDURE — 1159F PR MEDICATION LIST DOCUMENTED IN MEDICAL RECORD: ICD-10-PCS | Mod: S$GLB,,, | Performed by: DERMATOLOGY

## 2020-07-28 RX ORDER — ACITRETIN 10 MG/1
10 CAPSULE ORAL DAILY
Qty: 30 CAPSULE | Refills: 11 | Status: SHIPPED | OUTPATIENT
Start: 2020-07-28 | End: 2020-09-14

## 2020-07-28 RX ORDER — HALOBETASOL PROPIONATE AND TAZAROTENE .1; .45 MG/G; MG/G
LOTION TOPICAL
Qty: 100 G | Refills: 2 | Status: SHIPPED | OUTPATIENT
Start: 2020-07-28 | End: 2020-09-14

## 2020-07-28 RX ORDER — HALOBETASOL PROPIONATE AND TAZAROTENE .1; .45 MG/G; MG/G
LOTION TOPICAL
Qty: 100 G | Refills: 2 | Status: SHIPPED | OUTPATIENT
Start: 2020-07-28 | End: 2020-07-28

## 2020-07-28 NOTE — PROGRESS NOTES
"  Subjective:       Patient ID:  Gina Mackenzie is a 65 y.o. female who presents for   Chief Complaint   Patient presents with    Spot     New Patient     65 y.o. female who presents for Pustular Psoriasis diagnosed by Dr. Marcum with [facility] in 2018 (BIOPSIED).   Currently treating with Methotrexate  2.5 mg tablets (CURRENTLY 10 tabs per week)  Occasionally applies creams, "does not seem to help"  Neosporin is more helpful    Derm HX:   SCC - L lower leg 2017   Mom - multiple scc       Current Outpatient Medications:     buPROPion (WELLBUTRIN) 100 MG tablet, Take 1 tablet by mouth once daily (Patient taking differently: Take 50 mg by mouth once daily. ), Disp: 90 tablet, Rfl: 3    folic acid (FOLVITE) 1 MG tablet, Take 1 mg by mouth once daily., Disp: , Rfl:     levothyroxine (SYNTHROID) 100 MCG tablet, Take 1 tablet (100 mcg total) by mouth before breakfast., Disp: 90 tablet, Rfl: 1    medroxyPROGESTERone (PROVERA) 2.5 MG tablet, TAKE 1 TABLET BY MOUTH ONCE DAILY, Disp: 90 tablet, Rfl: 1    methotrexate 2.5 MG Tab, Take 25 mg by mouth every 7 days. , Disp: , Rfl:     estradiol (ESTRACE) 0.5 MG tablet, Take 1 tablet (0.5 mg total) by mouth once daily., Disp: 90 tablet, Rfl: 1          Review of Systems   Constitutional: Negative for fever, chills, fatigue and malaise.   Respiratory: Negative for cough and shortness of breath.    Skin: Positive for daily sunscreen use and activity-related sunscreen use. Negative for itching, rash, dry skin, sun sensitivity, sensitivity to antibiotic ointment, recent sunburn, dry lips and wears hat.   Hematologic/Lymphatic: Bruises/bleeds easily.      Past Medical History:   Diagnosis Date    Hypothyroidism     Leukopenia     Malignant neoplasm of skin     Non-melanoma skin cancer    Pustular psoriasis     Left foot    Squamous cell carcinoma in situ of skin of left lower leg 2016    Wellness examination      Objective:    Physical Exam   Constitutional: She " appears well-developed and well-nourished. No distress.   HENT:   Mouth/Throat: Lips normal.    Eyes: Lids are normal.    Neurological: She is alert and oriented to person, place, and time. She is not disoriented.   Psychiatric: She has a normal mood and affect. She is not agitated.   Skin:   Areas Examined (abnormalities noted in diagram):   Scalp / Hair Palpated and Inspected  Head / Face Inspection Performed  Neck Inspection Performed  Back Inspection Performed  RUE Inspected  LUE Inspection Performed  RLE Inspected  LLE Inspection Performed                       Diagram Legend     Erythematous scaling macule/papule c/w actinic keratosis       Vascular papule c/w angioma      Pigmented verrucoid papule/plaque c/w seborrheic keratosis      Yellow umbilicated papule c/w sebaceous hyperplasia      Irregularly shaped tan macule c/w lentigo     1-2 mm smooth white papules consistent with Milia      Movable subcutaneous cyst with punctum c/w epidermal inclusion cyst      Subcutaneous movable cyst c/w pilar cyst      Firm pink to brown papule c/w dermatofibroma      Pedunculated fleshy papule(s) c/w skin tag(s)      Evenly pigmented macule c/w junctional nevus     Mildly variegated pigmented, slightly irregular-bordered macule c/w mildly atypical nevus      Flesh colored to evenly pigmented papule c/w intradermal nevus       Pink pearly papule/plaque c/w basal cell carcinoma      Erythematous hyperkeratotic cursted plaque c/w SCC      Surgical scar with no sign of skin cancer recurrence      Open and closed comedones      Inflammatory papules and pustules      Verrucoid papule consistent consistent with wart     Erythematous eczematous patches and plaques     Dystrophic onycholytic nail with subungual debris c/w onychomycosis     Umbilicated papule    Erythematous-base heme-crusted tan verrucoid plaque consistent with inflamed seborrheic keratosis     Erythematous Silvery Scaling Plaque c/w Psoriasis     See  annotation        Lab Results   Component Value Date    CHOL 224 (H) 05/15/2020    CHOL 190 07/11/2019    CHOL 216 (H) 12/04/2018     Lab Results   Component Value Date    HDL 66 05/15/2020    HDL 60 07/11/2019    HDL 88 12/04/2018     Lab Results   Component Value Date    LDLCALC 145.2 05/15/2020    LDLCALC 111 (H) 07/11/2019    LDLCALC 111 (H) 12/04/2018     Lab Results   Component Value Date    TRIG 64 05/15/2020    TRIG 93 07/11/2019    TRIG 80 12/04/2018     Lab Results   Component Value Date    CHOLHDL 29.5 05/15/2020    CHOLHDL 3.2 07/11/2019    CHOLHDL 2.5 12/04/2018     CMP  Sodium   Date Value Ref Range Status   05/15/2020 138 136 - 145 mmol/L Final     Potassium   Date Value Ref Range Status   05/15/2020 4.1 3.5 - 5.1 mmol/L Final     Chloride   Date Value Ref Range Status   05/15/2020 103 95 - 110 mmol/L Final     CO2   Date Value Ref Range Status   05/15/2020 28 23 - 29 mmol/L Final     Glucose   Date Value Ref Range Status   05/15/2020 92 70 - 110 mg/dL Final     BUN, Bld   Date Value Ref Range Status   05/15/2020 14 8 - 23 mg/dL Final     Creatinine   Date Value Ref Range Status   05/15/2020 0.9 0.5 - 1.4 mg/dL Final     Calcium   Date Value Ref Range Status   05/15/2020 8.8 8.7 - 10.5 mg/dL Final     Total Protein   Date Value Ref Range Status   05/15/2020 6.9 6.0 - 8.4 g/dL Final   05/15/2020 6.9 6.0 - 8.4 g/dL Final     Albumin   Date Value Ref Range Status   05/15/2020 3.9 3.5 - 5.2 g/dL Final   05/15/2020 3.9 3.5 - 5.2 g/dL Final     Total Bilirubin   Date Value Ref Range Status   05/15/2020 0.6 0.1 - 1.0 mg/dL Final     Comment:     For infants and newborns, interpretation of results should be based  on gestational age, weight and in agreement with clinical  observations.  Premature Infant recommended reference ranges:  Up to 24 hours.............<8.0 mg/dL  Up to 48 hours............<12.0 mg/dL  3-5 days..................<15.0 mg/dL  6-29 days.................<15.0 mg/dL     05/15/2020 0.6 0.1  - 1.0 mg/dL Final     Comment:     For infants and newborns, interpretation of results should be based  on gestational age, weight and in agreement with clinical  observations.  Premature Infant recommended reference ranges:  Up to 24 hours.............<8.0 mg/dL  Up to 48 hours............<12.0 mg/dL  3-5 days..................<15.0 mg/dL  6-29 days.................<15.0 mg/dL       Alkaline Phosphatase   Date Value Ref Range Status   05/15/2020 40 (L) 55 - 135 U/L Final   05/15/2020 39 (L) 55 - 135 U/L Final     AST   Date Value Ref Range Status   05/15/2020 19 10 - 40 U/L Final   05/15/2020 19 10 - 40 U/L Final     ALT   Date Value Ref Range Status   05/15/2020 14 10 - 44 U/L Final   05/15/2020 14 10 - 44 U/L Final     Anion Gap   Date Value Ref Range Status   05/15/2020 7 (L) 8 - 16 mmol/L Final     eGFR if    Date Value Ref Range Status   05/15/2020 >60.0 >60 mL/min/1.73 m^2 Final     eGFR if non    Date Value Ref Range Status   05/15/2020 >60.0 >60 mL/min/1.73 m^2 Final     Comment:     Calculation used to obtain the estimated glomerular filtration  rate (eGFR) is the CKD-EPI equation.        Lab Results   Component Value Date    WBC 3.3 (L) 06/23/2020    HGB 13.1 06/23/2020    HCT 40.2 06/23/2020    MCV 98.5 06/23/2020     06/23/2020         Assessment / Plan:        Pustular psoriasis  -     Discontinue: halobetasol propion-tazarotene (DUOBRII) 0.01-0.045 % Lotn; aaa sole daily  Dispense: 100 g; Refill: 2  -     acitretin (SORIATANE) 10 MG capsule; Take 1 capsule (10 mg total) by mouth once daily.  Dispense: 30 capsule; Refill: 11  -     halobetasol propion-tazarotene (DUOBRII) 0.01-0.045 % Lotn; aaa sole daily  Dispense: 100 g; Refill: 2    isolated plaque on left sole,   Did not respond to MTX high dose (since 2018)  Start weaning by 2 tablets every week  Add duobrii daily  Rock salt soak once daily  Supplement zinc (low alk phos)  Plan to start soriatane 10mg in 2  weeks (overlap with 10mg MTX)  No  Alcohol consumption    Discussed otezla if fails to control         Follow up in about 6 weeks (around 9/8/2020).

## 2020-07-28 NOTE — LETTER
July 28, 2020      Jami Cedeno III, MD  [address]  [place] [address]           [address] [address]  Phone: [phone]          Patient: Gina Mackenzie   MR Number: [id number]   YOB: 1955   Date of Visit: 7/28/2020       Dear Dr. Jami Cedeno III:    Thank you for referring Gina Mackenzie to me for evaluation. Attached you will find relevant portions of my assessment and plan of care.    If you have questions, please do not hesitate to call me. I look forward to following Gina Mackenzie along with you.    Sincerely,    Lizzie Barrientos MD    Enclosure  CC:  No Recipients    If you would like to receive this communication electronically, please contact [email or web address] or [phone] to request more information on [facility] access.    For providers and/or their staff who would like to refer a patient to [facility], please contact us through our one-stop-shop provider referral line, [facility], at [phone].    If you feel you have received this communication in error or would no longer like to receive these types of communications, please e-mail [email or web address]

## 2020-07-29 ENCOUNTER — TELEPHONE (OUTPATIENT)
Dept: DERMATOLOGY | Facility: CLINIC | Age: 65
End: 2020-07-29

## 2020-07-29 NOTE — TELEPHONE ENCOUNTER
----- Message from Nat Singh MA sent at 7/28/2020  4:36 PM CDT -----  Type: Needs Medical Advice  Who Called:  Reynaldo Byrne  Best Call Back Number:   Additional Information: drug interaction with methotrexate and acitretin.  Please call to discuss

## 2020-07-30 NOTE — TELEPHONE ENCOUNTER
Spoke to pt. Explained ok to take as Dr Barrientos advised with loew dose overlapping. Pt verbalized understanding.

## 2020-07-30 NOTE — TELEPHONE ENCOUNTER
We discussed this at the time of visit and are planning on weaning off MTX (by 2 tabs per week) and start acitretin 10 (low dose) when we reach MTX 10mg QW

## 2020-07-31 ENCOUNTER — TELEPHONE (OUTPATIENT)
Dept: DERMATOLOGY | Facility: CLINIC | Age: 65
End: 2020-07-31

## 2020-07-31 DIAGNOSIS — L40.1 PUSTULAR PSORIASIS: Primary | ICD-10-CM

## 2020-07-31 RX ORDER — BETAMETHASONE DIPROPIONATE 0.5 MG/G
CREAM TOPICAL
Qty: 45 G | Refills: 2 | Status: SHIPPED | OUTPATIENT
Start: 2020-07-31 | End: 2021-05-18

## 2020-07-31 NOTE — TELEPHONE ENCOUNTER
Pt seen 7/28  Duobrii Lotion not covered for pt.   Insuracne preferred is Mometasone Furoate, Triamcinolone Acetonide, or betamethasone Dipropionate  Please advise and send to pharmacy in [place]    Pustular psoriasis  -     Discontinue: halobetasol propion-tazarotene (DUOBRII) 0.01-0.045 % Lotn; aaa sole daily  Dispense: 100 g; Refill: 2  -     acitretin (SORIATANE) 10 MG capsule; Take 1 capsule (10 mg total) by mouth once daily.  Dispense: 30 capsule; Refill: 11  -     halobetasol propion-tazarotene (DUOBRII) 0.01-0.045 % Lotn; aaa sole daily  Dispense: 100 g; Refill: 2     isolated plaque on left sole,   Did not respond to MTX high dose (since 2018)  Start weaning by 2 tablets every week  Add duobrii daily  Rock salt soak once daily  Supplement zinc (low alk phos)  Plan to start soriatane 10mg in 2 weeks (overlap with 10mg MTX)  No  Alcohol consumption     Discussed otezla if fails to control  .

## 2020-08-03 ENCOUNTER — TELEPHONE (OUTPATIENT)
Dept: DERMATOLOGY | Facility: CLINIC | Age: 65
End: 2020-08-03

## 2020-08-03 DIAGNOSIS — L40.1 PUSTULAR PSORIASIS: Primary | ICD-10-CM

## 2020-08-03 NOTE — TELEPHONE ENCOUNTER
----- Message from Jose Eduardo Schwartz sent at 8/3/2020  3:09 PM CDT -----  Contact: patient  Patient called in and stated her Rx for acitretin (SORIATANE) 10 MG capsule was too expensive & wanted to see if an alternative could be called in for patient.      [facility] [address] [place] [address], [place] - [address] [address]  Phone: [phone] Fax: [phone]    Patient call back number is [phone]

## 2020-08-04 RX ORDER — APREMILAST 30 MG/1
TABLET, FILM COATED ORAL
Qty: 60 TABLET | Refills: 2 | Status: SHIPPED | OUTPATIENT
Start: 2020-08-04 | End: 2020-12-07 | Stop reason: SDUPTHER

## 2020-08-04 NOTE — TELEPHONE ENCOUNTER
----- Message from Marta Migel sent at 8/4/2020 12:20 PM CDT -----  Regarding: cost of meds  Contact: pt  Type: Needs Medical Advice    Who Called:  pt    Best Call Back Number: [phone]    Additional Information: Requesting a call back from Nurse, regarding Rx acitretin (SORIATANE) 10 MG capsule is too expensive for pt and she needs something else called in ,please call back with advice ASAP she left a message yesterday

## 2020-08-04 NOTE — TELEPHONE ENCOUNTER
Spoke with patient, advised I spoke with Dr Barrientos and she recommends starting on the Methotrexate or we can try getting Otezla approved.  She would like to try Otezla so I will start the paper work and advise Dr Barrientos.  In the meantime stay on MTX, she is taking 10 pills every week and is wondering if she should have a dose increase?

## 2020-08-04 NOTE — TELEPHONE ENCOUNTER
We do not give more than 25mg per week of methotrexate. If it does not work at that high dose, time to move to another therapy.  I will send otezla to specialty pharmacy

## 2020-08-05 ENCOUNTER — TELEPHONE (OUTPATIENT)
Dept: PHARMACY | Facility: CLINIC | Age: 65
End: 2020-08-05

## 2020-08-05 ENCOUNTER — TELEPHONE (OUTPATIENT)
Dept: DERMATOLOGY | Facility: CLINIC | Age: 65
End: 2020-08-05

## 2020-08-05 NOTE — TELEPHONE ENCOUNTER
Spoke with patient, informed we do not give more than 25mg per week of methotrexate. If it does not work at that high dose, time to move to another therapy.  I will send otezla to specialty pharmacy.

## 2020-08-06 ENCOUNTER — LAB VISIT (OUTPATIENT)
Dept: LAB | Facility: HOSPITAL | Age: 65
End: 2020-08-06
Attending: NURSE PRACTITIONER
Payer: MEDICARE

## 2020-08-06 DIAGNOSIS — L40.8 OTHER PSORIASIS: Primary | ICD-10-CM

## 2020-08-06 LAB
ALBUMIN SERPL BCP-MCNC: 4.4 G/DL (ref 3.5–5.2)
ALP SERPL-CCNC: 48 U/L (ref 55–135)
ALT SERPL W/O P-5'-P-CCNC: 22 U/L (ref 10–44)
AST SERPL-CCNC: 25 U/L (ref 10–40)
BASOPHILS # BLD AUTO: 0.06 K/UL (ref 0–0.2)
BASOPHILS NFR BLD: 1.5 % (ref 0–1.9)
BILIRUB DIRECT SERPL-MCNC: 0.1 MG/DL (ref 0.1–0.3)
BILIRUB SERPL-MCNC: 0.7 MG/DL (ref 0.1–1)
DIFFERENTIAL METHOD: ABNORMAL
EOSINOPHIL # BLD AUTO: 0.1 K/UL (ref 0–0.5)
EOSINOPHIL NFR BLD: 1.5 % (ref 0–8)
ERYTHROCYTE [DISTWIDTH] IN BLOOD BY AUTOMATED COUNT: 13.2 % (ref 11.5–14.5)
HCT VFR BLD AUTO: 40.8 % (ref 37–48.5)
HGB BLD-MCNC: 13.3 G/DL (ref 12–16)
IMM GRANULOCYTES # BLD AUTO: 0.01 K/UL (ref 0–0.04)
IMM GRANULOCYTES NFR BLD AUTO: 0.3 % (ref 0–0.5)
LYMPHOCYTES # BLD AUTO: 1 K/UL (ref 1–4.8)
LYMPHOCYTES NFR BLD: 26.4 % (ref 18–48)
MCH RBC QN AUTO: 31.7 PG (ref 27–31)
MCHC RBC AUTO-ENTMCNC: 32.6 G/DL (ref 32–36)
MCV RBC AUTO: 97 FL (ref 82–98)
MONOCYTES # BLD AUTO: 0.4 K/UL (ref 0.3–1)
MONOCYTES NFR BLD: 11.3 % (ref 4–15)
NEUTROPHILS # BLD AUTO: 2.3 K/UL (ref 1.8–7.7)
NEUTROPHILS NFR BLD: 59 % (ref 38–73)
NRBC BLD-RTO: 0 /100 WBC
PLATELET # BLD AUTO: 219 K/UL (ref 150–350)
PMV BLD AUTO: 11.5 FL (ref 9.2–12.9)
PROT SERPL-MCNC: 7.4 G/DL (ref 6–8.4)
RBC # BLD AUTO: 4.19 M/UL (ref 4–5.4)
WBC # BLD AUTO: 3.9 K/UL (ref 3.9–12.7)

## 2020-08-06 PROCEDURE — 85025 COMPLETE CBC W/AUTO DIFF WBC: CPT

## 2020-08-06 PROCEDURE — 80076 HEPATIC FUNCTION PANEL: CPT

## 2020-08-06 PROCEDURE — 36415 COLL VENOUS BLD VENIPUNCTURE: CPT

## 2020-08-11 ENCOUNTER — TELEPHONE (OUTPATIENT)
Dept: DERMATOLOGY | Facility: CLINIC | Age: 65
End: 2020-08-11

## 2020-08-11 NOTE — TELEPHONE ENCOUNTER
DOCUMENTATION ONLY: Prior authorization for OTEZLA approved from 1/1/2020 to 12/31/2020. Case ID# [id number] Co-pay: $244.36 Patient Assistance IS required and being researched Forward to patient assistance for review. FLC

## 2020-08-11 NOTE — TELEPHONE ENCOUNTER
Returned pt's call. Pt states she took last dose of MTX last week. Asking if she still needs to take folic acid. Informed pt she can D/c. Pt asked the status of otezla approval. Informed still pending approval.

## 2020-08-11 NOTE — TELEPHONE ENCOUNTER
----- Message from Tova Wolf sent at 8/11/2020  8:59 AM CDT -----  Regarding: Medication Questions  Contact: Patient  Type: Needs Medical Advice    Who Called:  Patient    Best Call Back Number: [phone]    Additional Information:   Patient requesting call back from nurse in regards to medication, has questions for nurse.

## 2020-08-15 ENCOUNTER — TELEPHONE (OUTPATIENT)
Dept: DERMATOLOGY | Facility: HOSPITAL | Age: 65
End: 2020-08-15

## 2020-08-15 ENCOUNTER — NURSE TRIAGE (OUTPATIENT)
Dept: ADMINISTRATIVE | Facility: CLINIC | Age: 65
End: 2020-08-15

## 2020-08-15 NOTE — TELEPHONE ENCOUNTER
"  Reason for Disposition   [1] Caller has URGENT medication question about med that PCP or specialist prescribed AND [2] triager unable to answer question    Additional Information   Negative: Drug overdose and triager unable to answer question   Negative: Caller requesting information unrelated to medicine   Negative: Caller requesting a prescription for Strep throat and has a positive culture result   Negative: Rash while taking a medication or within 3 days of stopping it   Negative: Immunization reaction suspected   Negative: [1] Asthma and [2] having symptoms of asthma (cough, wheezing, etc.)   Negative: [1] Influenza symptoms AND [2] anti-viral med prescription request, such as Tamiflu   Negative: [1] Symptom of illness (e.g., headache, abdominal pain, earache, vomiting) AND [2] more than mild   Negative: MORE THAN A DOUBLE DOSE of a prescription or over-the-counter (OTC) drug   Negative: [1] DOUBLE DOSE (an extra dose or lesser amount) of over-the-counter (OTC) drug AND [2] any symptoms (e.g., dizziness, nausea, pain, sleepiness)   Negative: [1] DOUBLE DOSE (an extra dose or lesser amount) of prescription drug AND [2] any symptoms (e.g., dizziness, nausea, pain, sleepiness)   Negative: Took another person's prescription drug   Negative: [1] Pharmacy calling with prescription questions AND [2] triager unable to answer question   Negative: [1] Prescription not at pharmacy AND [2] was prescribed by PCP recently   Negative: [1] Request for URGENT new prescription or refill of "essential" medication (i.e., likelihood of harm to patient if not taken) AND [2] triager unable to fill per unit policy   Negative: Diabetes drug error or overdose (e.g., took wrong type of insulin or took extra dose)   Negative: [1] DOUBLE DOSE (an extra dose or lesser amount) of prescription drug AND [2] NO symptoms (Exception: a double dose of antibiotics)    Protocols used: [facility]   Pt called re " started new med soritane for psoriasis. Pt making pt violently sick - having vomiting. having chills. V x 2. Took med this am at 10 soon after having nausea. afeb. no CP, no SOB. Per  no one on call for [facility]. Spoke with dr cain. Pt warm transferred to speak with MD.

## 2020-08-17 DIAGNOSIS — R11.0 NAUSEA: Primary | ICD-10-CM

## 2020-08-17 RX ORDER — ONDANSETRON 4 MG/1
4 TABLET, FILM COATED ORAL DAILY PRN
Qty: 30 TABLET | Refills: 1 | Status: SHIPPED | OUTPATIENT
Start: 2020-08-17 | End: 2020-11-17

## 2020-08-17 NOTE — TELEPHONE ENCOUNTER
Called to check on pt. Requesting zofran for nausea to take prior to soriatane. Last dose of MTX was 6/8/2020. Pt states foot looks much better, has also been soaking in salt water and applying duobrii.

## 2020-08-17 NOTE — TELEPHONE ENCOUNTER
----- Message from Florinda Hough sent at 8/17/2020  8:25 AM CDT -----  Regarding: medication/nausea  Contact: patient  Type: Needs Medical Advice  Who Called:  patient  Symptoms (please be specific):  nausea  How long has patient had these symptoms:  weekend  Pharmacy name and phone #:  ?  Best Call Back Number: [phone]  Additional Information: Patient states she is taking new medication and it caused her to be ill and very sick to her stomach.  Patient would like a Rx for nausea.  Please call to advise.  Thanks!

## 2020-08-17 NOTE — TELEPHONE ENCOUNTER
Patient spoke with dermatologist on call over the weekend and was instructed to hold the Soritane until speaking with you

## 2020-09-02 ENCOUNTER — TELEPHONE (OUTPATIENT)
Dept: DERMATOLOGY | Facility: CLINIC | Age: 65
End: 2020-09-02

## 2020-09-02 NOTE — TELEPHONE ENCOUNTER
Spoke with patient, states she has about 10 pills left of Soritane, should she take the Otezla when it comes in or stay on this medication

## 2020-09-02 NOTE — TELEPHONE ENCOUNTER
----- Message from Nat Singh MA sent at 9/2/2020 10:19 AM CDT -----  Type: Needs Medical Advice  Who Called:  Gina  Guillermo Call Back Number: [phone]  Additional Information: patient states the Otezla has been approved and she will receive tomorrow.  She is asking should she continue on the zoratan, she states it is working, or should she start using the otezla.  She has an appointment on Sept 8. Please call to discuss

## 2020-09-04 NOTE — TELEPHONE ENCOUNTER
FYI:     Patient has been enrolled in [employer] [facility] assistance program for their OTEZLA prescription. Patient has been approved from 9/2/2020 to 12/31/2020. Patient will be receiving medication directly from the assistance programs pharmacy. All future refill authorizations should be forwarded to them directly.

## 2020-09-08 ENCOUNTER — TELEPHONE (OUTPATIENT)
Dept: DERMATOLOGY | Facility: CLINIC | Age: 65
End: 2020-09-08

## 2020-09-08 ENCOUNTER — PATIENT MESSAGE (OUTPATIENT)
Dept: DERMATOLOGY | Facility: CLINIC | Age: 65
End: 2020-09-08

## 2020-09-08 NOTE — TELEPHONE ENCOUNTER
Returned pt's call. Informed appt was changed to virtual visit per her request at 9:45. Pt informed to upload 3 pictures prior to visit. Verbalized understanding.

## 2020-09-08 NOTE — TELEPHONE ENCOUNTER
----- Message from Ayla Hernandez sent at 9/8/2020  8:14 AM CDT -----  Type: Needs Medical Advice  Who Called:  Patient  Best Call Back Number:   Additional Information: Calling to find out if she can get her appointment today set as a virtual visit as she is not feeling well.

## 2020-09-10 NOTE — TELEPHONE ENCOUNTER
Again, I would like to see Ms Aquilinoranjeet in person or virtually to reassess and discuss next step.

## 2020-10-13 ENCOUNTER — HOSPITAL ENCOUNTER (OUTPATIENT)
Dept: RADIOLOGY | Facility: HOSPITAL | Age: 65
Discharge: HOME | End: 2020-10-13
Attending: NURSE PRACTITIONER
Payer: COMMERCIAL

## 2020-10-13 VITALS — BODY MASS INDEX: 22.75 KG/M2 | WEIGHT: 158.94 LBS | HEIGHT: 70 IN

## 2020-10-13 DIAGNOSIS — Z12.31 ENCOUNTER FOR SCREENING MAMMOGRAM FOR MALIGNANT NEOPLASM OF BREAST: ICD-10-CM

## 2020-10-13 PROCEDURE — 77067 SCR MAMMO BI INCL CAD: CPT | Mod: TC

## 2020-10-13 PROCEDURE — 77067 SCR MAMMO BI INCL CAD: CPT | Mod: 26,,, | Performed by: RADIOLOGY

## 2020-10-13 PROCEDURE — 77067 MAMMO DIGITAL SCREENING BILAT WITH TOMO: ICD-10-PCS | Mod: 26,,, | Performed by: RADIOLOGY

## 2020-10-13 PROCEDURE — 77063 MAMMO DIGITAL SCREENING BILAT WITH TOMO: ICD-10-PCS | Mod: 26,,, | Performed by: RADIOLOGY

## 2020-10-13 PROCEDURE — 77063 BREAST TOMOSYNTHESIS BI: CPT | Mod: 26,,, | Performed by: RADIOLOGY

## 2020-10-16 ENCOUNTER — OFFICE VISIT (OUTPATIENT)
Dept: DERMATOLOGY | Facility: CLINIC | Age: 65
End: 2020-10-16
Payer: COMMERCIAL

## 2020-10-16 DIAGNOSIS — D48.5 NEOPLASM OF UNCERTAIN BEHAVIOR OF SKIN: Primary | ICD-10-CM

## 2020-10-16 DIAGNOSIS — L57.0 ACTINIC KERATOSES: ICD-10-CM

## 2020-10-16 DIAGNOSIS — L82.1 SEBORRHEIC KERATOSES: ICD-10-CM

## 2020-10-16 DIAGNOSIS — B00.9 HERPES SIMPLEX INFECTION: ICD-10-CM

## 2020-10-16 DIAGNOSIS — L40.1 PUSTULAR PSORIASIS: ICD-10-CM

## 2020-10-16 DIAGNOSIS — L81.4 SOLAR LENTIGO: ICD-10-CM

## 2020-10-16 PROCEDURE — 1126F AMNT PAIN NOTED NONE PRSNT: CPT | Mod: S$GLB,,, | Performed by: DERMATOLOGY

## 2020-10-16 PROCEDURE — 99999 PR PBB SHADOW E&M-EST. PATIENT-LVL III: CPT | Mod: PBBFAC,,, | Performed by: DERMATOLOGY

## 2020-10-16 PROCEDURE — 11102 TANGNTL BX SKIN SINGLE LES: CPT | Mod: S$GLB,,, | Performed by: DERMATOLOGY

## 2020-10-16 PROCEDURE — 1159F MED LIST DOCD IN RCRD: CPT | Mod: S$GLB,,, | Performed by: DERMATOLOGY

## 2020-10-16 PROCEDURE — 1101F PT FALLS ASSESS-DOCD LE1/YR: CPT | Mod: CPTII,S$GLB,, | Performed by: DERMATOLOGY

## 2020-10-16 PROCEDURE — 88305 TISSUE EXAM BY PATHOLOGIST: ICD-10-PCS | Mod: 26,,, | Performed by: PATHOLOGY

## 2020-10-16 PROCEDURE — 1126F PR PAIN SEVERITY QUANTIFIED, NO PAIN PRESENT: ICD-10-PCS | Mod: S$GLB,,, | Performed by: DERMATOLOGY

## 2020-10-16 PROCEDURE — 11102 PR TANGENTIAL BIOPSY, SKIN, SINGLE LESION: ICD-10-PCS | Mod: S$GLB,,, | Performed by: DERMATOLOGY

## 2020-10-16 PROCEDURE — 99999 PR PBB SHADOW E&M-EST. PATIENT-LVL III: ICD-10-PCS | Mod: PBBFAC,,, | Performed by: DERMATOLOGY

## 2020-10-16 PROCEDURE — 99214 PR OFFICE/OUTPT VISIT, EST, LEVL IV, 30-39 MIN: ICD-10-PCS | Mod: 25,S$GLB,, | Performed by: DERMATOLOGY

## 2020-10-16 PROCEDURE — 1101F PR PT FALLS ASSESS DOC 0-1 FALLS W/OUT INJ PAST YR: ICD-10-PCS | Mod: CPTII,S$GLB,, | Performed by: DERMATOLOGY

## 2020-10-16 PROCEDURE — 99214 OFFICE O/P EST MOD 30 MIN: CPT | Mod: 25,S$GLB,, | Performed by: DERMATOLOGY

## 2020-10-16 PROCEDURE — 17000 DESTRUCT PREMALG LESION: CPT | Mod: 59,S$GLB,, | Performed by: DERMATOLOGY

## 2020-10-16 PROCEDURE — 1159F PR MEDICATION LIST DOCUMENTED IN MEDICAL RECORD: ICD-10-PCS | Mod: S$GLB,,, | Performed by: DERMATOLOGY

## 2020-10-16 PROCEDURE — 17000 PR DESTRUCTION(LASER SURGERY,CRYOSURGERY,CHEMOSURGERY),PREMALIGNANT LESIONS,FIRST LESION: ICD-10-PCS | Mod: 59,S$GLB,, | Performed by: DERMATOLOGY

## 2020-10-16 PROCEDURE — 88305 TISSUE EXAM BY PATHOLOGIST: CPT | Mod: 26,,, | Performed by: PATHOLOGY

## 2020-10-16 PROCEDURE — 88305 TISSUE EXAM BY PATHOLOGIST: CPT | Performed by: PATHOLOGY

## 2020-10-16 RX ORDER — VALACYCLOVIR HYDROCHLORIDE 1 G/1
TABLET, FILM COATED ORAL
Qty: 20 TABLET | Refills: 2 | Status: SHIPPED | OUTPATIENT
Start: 2020-10-16 | End: 2021-01-29 | Stop reason: SDUPTHER

## 2020-10-16 NOTE — PATIENT INSTRUCTIONS
Shave Biopsy Wound Care    Your doctor has performed a shave biopsy today.  A band aid and vaseline ointment has been placed over the site.  This should remain in place for 24 hours.  It is recommended that you keep the area dry for the first 24 hours.  After 24 hours, you may remove the band aid and wash the area with warm soap and water and apply Vaseline jelly.  Many patients prefer to use Neosporin or Bacitracin ointment.  This is acceptable; however, know that you can develop an allergy to this medication even if you have used it safely for years.  It is important to keep the area moist.  Letting it dry out and get air slows healing time, and will worsen the scar.  Band aid is optional after first 24 hours.      If you notice increasing redness, tenderness, pain, or yellow drainage at the biopsy site, please notify your doctor.  These are signs of an infection.    If your biopsy site is bleeding, apply firm pressure for 15 minutes straight.  Repeat for another 15 minutes, if it is still bleeding.   If the surgical site continues to bleed, then please contact your doctor.       [facility]  [address] [place] [address], [address] [address]  Dept: [phone]       CRYOSURGERY      Your doctor has used a method called cryosurgery to treat your skin condition. Cryosurgery refers to the use of very cold substances to treat a variety of skin conditions such as warts, pre-skin cancers, molluscum contagiosum, sun spots, and several benign growths. The substance we use in cryosurgery is liquid nitrogen and is so cold (-195 degrees Celsius) that is burns when administered.     Following treatment in the office, the skin may immediately burn and become red. You may find the area around the lesion is affected as well. It is sometimes necessary to treat not only the lesion, but a small area of the surrounding normal skin to achieve a good response.     A blister, and even a blood  filled blister, may form after treatment.   This is a normal response. If the blister is painful, it is acceptable to sterilize a needle and with rubbing alcohol and gently pop the blister. It is important that you gently wash the area with soap and warm water as the blister fluid may contain wart virus if a wart was treated. Do no remove the roof of the blister.     The area treated can take anywhere from 1-3 weeks to heal. Healing time depends on the kind skin lesion treated, the location, and how aggressively the lesion was treated. It is recommended that the areas treated are covered with Vaseline or bacitracin ointment and a band-aid. If a band-aid is not practical, just ointment applied several times per day will do. Keeping these areas moist will speed the healing time.    Treatment with liquid nitrogen can leave a scar. In dark skin, it may be a light or dark scar, in light skin it may be a white or pink scar. These will generally fade with time, but may never go away completely.     If you have any concerns after your treatment, please feel free to call the office.         [facility] - DERMATOLOGY  [address] [place] [address]  Dept: [phone]

## 2020-10-16 NOTE — PROGRESS NOTES
Subjective:       Patient ID:  Gina Mackenzie is a 65 y.o. female who presents for   Chief Complaint   Patient presents with    Skin Check     Last OV 7-28-20  Pustular Psoriasis of left sole    Here for f/u & skin check    States foot has cleared significantly  Denies negative side effects of medication  Currently taking 60mg of Otezla, requests lowering dosage since skin is clearing.    Derm HX:   SCC - L lower leg 2017   Mom - multiple scc       Review of Systems   Constitutional: Negative for fever, chills, fatigue and malaise.   Respiratory: Negative for cough and shortness of breath.    Skin: Positive for daily sunscreen use and activity-related sunscreen use. Negative for itching, rash, dry skin, sun sensitivity, sensitivity to antibiotic ointment, recent sunburn, dry lips and wears hat.   Hematologic/Lymphatic: Bruises/bleeds easily.        Objective:    Physical Exam   Constitutional: She appears well-developed and well-nourished. No distress.   HENT:   Mouth/Throat: Lips normal.    Eyes: Lids are normal.    Neurological: She is alert and oriented to person, place, and time. She is not disoriented.   Psychiatric: She has a normal mood and affect. She is not agitated.   Skin:   Areas Examined (abnormalities noted in diagram):   Scalp / Hair Palpated and Inspected  Head / Face Inspection Performed  Neck Inspection Performed  Back Inspection Performed  RUE Inspected  LUE Inspection Performed  RLE Inspected  LLE Inspection Performed                       Diagram Legend     Erythematous scaling macule/papule c/w actinic keratosis       Vascular papule c/w angioma      Pigmented verrucoid papule/plaque c/w seborrheic keratosis      Yellow umbilicated papule c/w sebaceous hyperplasia      Irregularly shaped tan macule c/w lentigo     1-2 mm smooth white papules consistent with Milia      Movable subcutaneous cyst with punctum c/w epidermal inclusion cyst      Subcutaneous movable cyst c/w pilar cyst      Firm  pink to brown papule c/w dermatofibroma      Pedunculated fleshy papule(s) c/w skin tag(s)      Evenly pigmented macule c/w junctional nevus     Mildly variegated pigmented, slightly irregular-bordered macule c/w mildly atypical nevus      Flesh colored to evenly pigmented papule c/w intradermal nevus       Pink pearly papule/plaque c/w basal cell carcinoma      Erythematous hyperkeratotic cursted plaque c/w SCC      Surgical scar with no sign of skin cancer recurrence      Open and closed comedones      Inflammatory papules and pustules      Verrucoid papule consistent consistent with wart     Erythematous eczematous patches and plaques     Dystrophic onycholytic nail with subungual debris c/w onychomycosis     Umbilicated papule    Erythematous-base heme-crusted tan verrucoid plaque consistent with inflamed seborrheic keratosis     Erythematous Silvery Scaling Plaque c/w Psoriasis     See annotation          Assessment / Plan:      Pathology Orders:     Normal Orders This Visit    Specimen to Pathology, Dermatology     Questions:    Procedure Type: Dermatology and skin neoplasms    Number of Specimens: 1    ------------------------: -------------------------    Spec 1 Procedure: Biopsy    Spec 1 Clinical Impression: r/o bcc    Spec 1 Source: left lower back        Neoplasm of uncertain behavior of skin  Shave biopsy procedure note:    Shave biopsy performed after verbal consent including risk of infection, scar, recurrence, need for additional treatment of site. Area prepped with alcohol, anesthetized with approximately 1.0cc of 1% lidocaine with epinephrine. Lesional tissue shaved with razor blade. Hemostasis achieved with application of aluminum chloride followed by hyfrecation. No complications. Dressing applied. Wound care explained.    Pustular psoriasis  -     Specimen to Pathology, Dermatology  Cleared with one month soriatane followed by one month otezla after a year on high dose methotrexate that did not  touch it  Still wondering about tinea vs pso (did not scrape) but patient addcarolynt she was initially treated for fungus then biopsied to establish pso    Actinic keratoses  Cryosurgery Procedure Note    Verbal consent from the patient is obtained and the patient is aware of the precancerous quality and need for treatment of these lesions. Liquid nitrogen cryosurgery is applied to the 1 actinic keratoses, as detailed in the physical exam, to produce a freeze injury. The patient is aware that blisters may form and is instructed on wound care with gentle cleansing and use of vaseline ointment to keep moist until healed. The patient is supplied a handout on cryosurgery and is instructed to call if lesions do not completely resolve.    Herpes simplex infection  -     valACYclovir (VALTREX) 1000 MG tablet; 2 tablets PO Q12hrs x 2 doses; take at the first sign of herpes flare  Dispense: 20 tablet; Refill: 2  Pubis and upper buttocks  Flares with stress  approx 3 per year, several years since onset    Not currently sexually active    Seborrheic keratoses  These are benign inherited growths without a malignant potential. Reassurance given to patient. No treatment is necessary.     Solar lentigo  This is a benign hyperpigmented sun induced lesion. Daily sun protection will reduce the number of new lesions. Treatment of these benign lesions are considered cosmetic.    Patient instructed in importance in daily sun protection of at least spf 30. Mineral sunscreen ingredients preferred (Zinc +/- Titanium).   Recommend Elta MD for daily use on face and neck.  Patient encouraged to wear hat for all outdoor exposure.   Also discussed sun avoidance and use of protective clothing.           Follow up in about 6 months (around 4/16/2021).

## 2020-10-19 LAB
FINAL PATHOLOGIC DIAGNOSIS: NORMAL
GROSS: NORMAL
MICROSCOPIC EXAM: NORMAL

## 2020-10-23 ENCOUNTER — PATIENT MESSAGE (OUTPATIENT)
Dept: DERMATOLOGY | Facility: CLINIC | Age: 65
End: 2020-10-23

## 2020-10-23 ENCOUNTER — TELEPHONE (OUTPATIENT)
Dept: DERMATOLOGY | Facility: CLINIC | Age: 65
End: 2020-10-23

## 2020-10-23 NOTE — TELEPHONE ENCOUNTER
Pt instructed to go back to using 60mL and aggressive treatment with topicals per HE.   ----- Message from Robert Vasquez sent at 10/23/2020  9:20 AM CDT -----  Regarding: Medical questions  Contact: Patient  Patient want to speak with a nurse regarding another outbreak on foot need some medical advice please call back at [phone] (home)     Case number [id number]

## 2020-12-07 DIAGNOSIS — L40.1 PUSTULAR PSORIASIS: ICD-10-CM

## 2020-12-08 RX ORDER — APREMILAST 30 MG/1
TABLET, FILM COATED ORAL
Qty: 60 TABLET | Refills: 11 | Status: SHIPPED | OUTPATIENT
Start: 2020-12-08 | End: 2021-12-03 | Stop reason: SDUPTHER

## 2020-12-08 NOTE — TELEPHONE ENCOUNTER
LOV 10/16/2020  Pharmacy needs new rx for 2021 renewal process  Please advise....    Neoplasm of uncertain behavior of skin  Shave biopsy procedure note:     Shave biopsy performed after verbal consent including risk of infection, scar, recurrence, need for additional treatment of site. Area prepped with alcohol, anesthetized with approximately 1.0cc of 1% lidocaine with epinephrine. Lesional tissue shaved with razor blade. Hemostasis achieved with application of aluminum chloride followed by hyfrecation. No complications. Dressing applied. Wound care explained.     Pustular psoriasis  -     Specimen to Pathology, Dermatology  Cleared with one month soriatane followed by one month otezla after a year on high dose methotrexate that did not touch it  Still wondering about tinea vs pso (did not scrape) but patient addamant she was initially treated for fungus then biopsied to establish pso     Actinic keratoses  Cryosurgery Procedure Note     Verbal consent from the patient is obtained and the patient is aware of the precancerous quality and need for treatment of these lesions. Liquid nitrogen cryosurgery is applied to the 1 actinic keratoses, as detailed in the physical exam, to produce a freeze injury. The patient is aware that blisters may form and is instructed on wound care with gentle cleansing and use of vaseline ointment to keep moist until healed. The patient is supplied a handout on cryosurgery and is instructed to call if lesions do not completely resolve.     Herpes simplex infection  -     valACYclovir (VALTREX) 1000 MG tablet; 2 tablets PO Q12hrs x 2 doses; take at the first sign of herpes flare  Dispense: 20 tablet; Refill: 2  Pubis and upper buttocks  Flares with stress  approx 3 per year, several years since onset     Not currently sexually active     Seborrheic keratoses  These are benign inherited growths without a malignant potential. Reassurance given to patient. No treatment is necessary.       Solar lentigo  This is a benign hyperpigmented sun induced lesion. Daily sun protection will reduce the number of new lesions. Treatment of these benign lesions are considered cosmetic.     Patient instructed in importance in daily sun protection of at least spf 30. Mineral sunscreen ingredients preferred (Zinc +/- Titanium).   Recommend Elta MD for daily use on face and neck.  Patient encouraged to wear hat for all outdoor exposure.   Also discussed sun avoidance and use of protective clothing.

## 2020-12-13 ENCOUNTER — SPECIALTY PHARMACY (OUTPATIENT)
Dept: PHARMACY | Facility: CLINIC | Age: 65
End: 2020-12-13
Payer: COMMERCIAL

## 2020-12-13 DIAGNOSIS — L40.1 PUSTULAR PSORIASIS: Primary | ICD-10-CM

## 2021-01-05 ENCOUNTER — TELEPHONE (OUTPATIENT)
Dept: DERMATOLOGY | Facility: CLINIC | Age: 66
End: 2021-01-05

## 2021-10-15 ENCOUNTER — HOSPITAL ENCOUNTER (OUTPATIENT)
Dept: RADIOLOGY | Facility: HOSPITAL | Age: 66
Discharge: HOME | End: 2021-10-15
Attending: NURSE PRACTITIONER
Payer: COMMERCIAL

## 2021-10-15 VITALS — WEIGHT: 164 LBS | BODY MASS INDEX: 23.48 KG/M2 | HEIGHT: 70 IN

## 2021-10-15 DIAGNOSIS — Z12.31 ENCOUNTER FOR SCREENING MAMMOGRAM FOR BREAST CANCER: ICD-10-CM

## 2021-10-15 PROCEDURE — 77067 SCR MAMMO BI INCL CAD: CPT | Mod: 26,,, | Performed by: RADIOLOGY

## 2021-10-15 PROCEDURE — 77063 BREAST TOMOSYNTHESIS BI: CPT | Mod: 26,,, | Performed by: RADIOLOGY

## 2021-10-15 PROCEDURE — 77063 MAMMO DIGITAL SCREENING BILAT WITH TOMO: ICD-10-PCS | Mod: 26,,, | Performed by: RADIOLOGY

## 2021-10-15 PROCEDURE — 77067 SCR MAMMO BI INCL CAD: CPT | Mod: TC

## 2021-10-15 PROCEDURE — 77067 MAMMO DIGITAL SCREENING BILAT WITH TOMO: ICD-10-PCS | Mod: 26,,, | Performed by: RADIOLOGY

## 2021-12-03 ENCOUNTER — SPECIALTY PHARMACY (OUTPATIENT)
Dept: PHARMACY | Facility: CLINIC | Age: 66
End: 2021-12-03
Payer: COMMERCIAL

## 2021-12-03 ENCOUNTER — OFFICE VISIT (OUTPATIENT)
Dept: DERMATOLOGY | Facility: CLINIC | Age: 66
End: 2021-12-03
Payer: COMMERCIAL

## 2021-12-03 VITALS — WEIGHT: 159 LBS | BODY MASS INDEX: 22.76 KG/M2 | HEIGHT: 70 IN

## 2021-12-03 DIAGNOSIS — L82.1 SEBORRHEIC KERATOSES: ICD-10-CM

## 2021-12-03 DIAGNOSIS — L90.5 SCAR: ICD-10-CM

## 2021-12-03 DIAGNOSIS — L40.1 PUSTULAR PSORIASIS: ICD-10-CM

## 2021-12-03 DIAGNOSIS — Z85.828 HISTORY OF NONMELANOMA SKIN CANCER: ICD-10-CM

## 2021-12-03 DIAGNOSIS — Z12.83 SKIN CANCER SCREENING: ICD-10-CM

## 2021-12-03 DIAGNOSIS — L57.0 ACTINIC KERATOSES: Primary | ICD-10-CM

## 2021-12-03 PROCEDURE — 1157F PR ADVANCE CARE PLAN OR EQUIV PRESENT IN MEDICAL RECORD: ICD-10-PCS | Mod: CPTII,S$GLB,, | Performed by: DERMATOLOGY

## 2021-12-03 PROCEDURE — 1157F ADVNC CARE PLAN IN RCRD: CPT | Mod: CPTII,S$GLB,, | Performed by: DERMATOLOGY

## 2021-12-03 PROCEDURE — 99999 PR PBB SHADOW E&M-EST. PATIENT-LVL III: CPT | Mod: PBBFAC,,, | Performed by: DERMATOLOGY

## 2021-12-03 PROCEDURE — 99214 OFFICE O/P EST MOD 30 MIN: CPT | Mod: 25,S$GLB,, | Performed by: DERMATOLOGY

## 2021-12-03 PROCEDURE — 99214 PR OFFICE/OUTPT VISIT, EST, LEVL IV, 30-39 MIN: ICD-10-PCS | Mod: 25,S$GLB,, | Performed by: DERMATOLOGY

## 2021-12-03 PROCEDURE — 17000 PR DESTRUCTION(LASER SURGERY,CRYOSURGERY,CHEMOSURGERY),PREMALIGNANT LESIONS,FIRST LESION: ICD-10-PCS | Mod: S$GLB,,, | Performed by: DERMATOLOGY

## 2021-12-03 PROCEDURE — 99999 PR PBB SHADOW E&M-EST. PATIENT-LVL III: ICD-10-PCS | Mod: PBBFAC,,, | Performed by: DERMATOLOGY

## 2021-12-03 PROCEDURE — 17000 DESTRUCT PREMALG LESION: CPT | Mod: S$GLB,,, | Performed by: DERMATOLOGY

## 2021-12-03 RX ORDER — APREMILAST 30 MG/1
TABLET, FILM COATED ORAL
Qty: 60 TABLET | Refills: 11 | Status: SHIPPED | OUTPATIENT
Start: 2021-12-03 | End: 2021-12-03 | Stop reason: SDUPTHER

## 2021-12-03 RX ORDER — APREMILAST 30 MG/1
TABLET, FILM COATED ORAL
Qty: 60 TABLET | Refills: 11 | Status: SHIPPED | OUTPATIENT
Start: 2021-12-03 | End: 2022-04-19

## 2021-12-03 NOTE — TELEPHONE ENCOUNTER
Patient portion of PAP application completed in Nov 2021 for re-enrollment. Provider portion was faxed for completion by Fabiana on 11/16/21. (see prior Spec Rx encounter for details)    Received new Rx today. Submitting WANDA xie today on CM. Key: [id number]

## 2021-12-04 NOTE — TELEPHONE ENCOUNTER
PA Case # [id number]  Approval dates: 12/3/2021-12/31/2022 for Otezla 30mg tablets. Letter scanned into media.    Forwarding to FA queue for completion of PAP renewal.

## 2022-01-05 NOTE — TELEPHONE ENCOUNTER
Provider portion received.  Application submitted to [employer] @ [phone].  [phone] ()          MCP

## 2022-02-09 NOTE — TELEPHONE ENCOUNTER
Called gen for status check on Otezla PAP. Spoke with rep David. Rep states LIS denial letter is required in order to be approved. Explained to rep patient is over the income for LIS. Rep states that does not matter and LIS letter is required.     ARS

## 2022-02-17 NOTE — TELEPHONE ENCOUNTER
Returned patient's call for status check. Explained to patient she needs to apply to LIS and get denied in order to move forward with pap assistance. Provided patient with website and phone number to apply. Will f/u    ARS

## 2022-03-02 ENCOUNTER — TELEPHONE (OUTPATIENT)
Dept: DERMATOLOGY | Facility: CLINIC | Age: 67
End: 2022-03-02
Payer: COMMERCIAL

## 2022-03-02 NOTE — TELEPHONE ENCOUNTER
Outpatient call - N/A could not leave msg to inquire about LIS application will trying again on 3/3/22

## 2022-03-02 NOTE — TELEPHONE ENCOUNTER
Incoming call from [facility] looking for status check as [facility] reports that the patient called into [facility] looking for an update.. Based upon note on 2/17- pt was informed she needed to apply to LIS and get denied in order to move forward with PAP. We have not had any updates from the pt yet. [facility] requested [facility] outreach to patient to obtain updates. Forwarding to [facility] rheum team

## 2022-03-02 NOTE — TELEPHONE ENCOUNTER
Attempted to contact patient to review medications, no answer, lvm for a return call.     Spoke with [facility] and they stated they are waiting for the patient to apply for Medicaid and get denied before the manufactures assistance can be approved.     [facility] spoke with patient on 2/17 about this so patient is aware. They are waiting on documents from the patient before they can move forward.

## 2022-03-03 NOTE — TELEPHONE ENCOUNTER
Outpt call - success     Patient states she received a LIS denial letter and informed her we would need it to continue PAP process-     She agreed to send it to email [email or web address]    Will update once received.

## 2022-03-04 ENCOUNTER — TELEPHONE (OUTPATIENT)
Dept: DERMATOLOGY | Facility: CLINIC | Age: 67
End: 2022-03-04
Payer: COMMERCIAL

## 2022-03-04 NOTE — TELEPHONE ENCOUNTER
I think she needs to come in to discuss. If she is not willing to fill  Financial assistance forms, the medication will not be available to her (correct me if I misunderstood) and lowering the dose is pointless. We can go back to methotrexate, which is inexpensive and was helpful.

## 2022-03-04 NOTE — TELEPHONE ENCOUNTER
----- Message from Teresa Whitley MA sent at 3/4/2022 10:57 AM CST -----  Regarding: Otezela  Patient is still in limbo with Otezela, the documentation needed for her to qualify for the financial assistance the patient hasn't done. She is requesting to have the dosage lowered or the medication changed. She is responding to the Otezela.     ----- Message -----  From: Nat Singh MA  Sent: 3/4/2022  10:36 AM CST  To: [facility] Staff    Type:  Patient Returning Call    Who Called:  Gina  Who Left Message for Patient:  Teresa  Does the patient know what this is regarding?:    Best Call Back Number:  [phone]  Additional Information:

## 2022-03-17 NOTE — TELEPHONE ENCOUNTER
Outpatient call - no answer lvm    Called patient to inquire about LIS letter from 3/3 - I have not received email of LIS form

## 2022-03-24 NOTE — TELEPHONE ENCOUNTER
I have attempted to contact patient regarding additional information to continue the financial assistance process. I have been unable to reach patient after 3 attemps. I am rerouting to assigned pharmacist to close referral

## 2022-03-29 NOTE — TELEPHONE ENCOUNTER
Closing referral as [facility] has been unable to reach the patient regarding FA. Will reopen referral for assistance if patient reaches out to [facility] or new medication order is received. [facility] has been informed.

## 2022-04-19 ENCOUNTER — OFFICE VISIT (OUTPATIENT)
Dept: DERMATOLOGY | Facility: CLINIC | Age: 67
End: 2022-04-19
Payer: COMMERCIAL

## 2022-04-19 ENCOUNTER — TELEPHONE (OUTPATIENT)
Dept: DERMATOLOGY | Facility: CLINIC | Age: 67
End: 2022-04-19

## 2022-04-19 VITALS — BODY MASS INDEX: 22.76 KG/M2 | HEIGHT: 70 IN | WEIGHT: 159 LBS

## 2022-04-19 DIAGNOSIS — L57.0 ACTINIC KERATOSES: ICD-10-CM

## 2022-04-19 DIAGNOSIS — L40.1 PUSTULAR PSORIASIS: Primary | ICD-10-CM

## 2022-04-19 DIAGNOSIS — L82.1 SEBORRHEIC KERATOSES: ICD-10-CM

## 2022-04-19 PROCEDURE — 17000 PR DESTRUCTION(LASER SURGERY,CRYOSURGERY,CHEMOSURGERY),PREMALIGNANT LESIONS,FIRST LESION: ICD-10-PCS | Mod: S$GLB,,, | Performed by: DERMATOLOGY

## 2022-04-19 PROCEDURE — 17000 DESTRUCT PREMALG LESION: CPT | Mod: S$GLB,,, | Performed by: DERMATOLOGY

## 2022-04-19 PROCEDURE — 17003 DESTRUCTION, PREMALIGNANT LESIONS; SECOND THROUGH 14 LESIONS: ICD-10-PCS | Mod: S$GLB,,, | Performed by: DERMATOLOGY

## 2022-04-19 PROCEDURE — 1126F PR PAIN SEVERITY QUANTIFIED, NO PAIN PRESENT: ICD-10-PCS | Mod: CPTII,S$GLB,, | Performed by: DERMATOLOGY

## 2022-04-19 PROCEDURE — 1157F ADVNC CARE PLAN IN RCRD: CPT | Mod: CPTII,S$GLB,, | Performed by: DERMATOLOGY

## 2022-04-19 PROCEDURE — 1160F RVW MEDS BY RX/DR IN RCRD: CPT | Mod: CPTII,S$GLB,, | Performed by: DERMATOLOGY

## 2022-04-19 PROCEDURE — 99214 PR OFFICE/OUTPT VISIT, EST, LEVL IV, 30-39 MIN: ICD-10-PCS | Mod: 25,S$GLB,, | Performed by: DERMATOLOGY

## 2022-04-19 PROCEDURE — 3008F BODY MASS INDEX DOCD: CPT | Mod: CPTII,S$GLB,, | Performed by: DERMATOLOGY

## 2022-04-19 PROCEDURE — 3008F PR BODY MASS INDEX (BMI) DOCUMENTED: ICD-10-PCS | Mod: CPTII,S$GLB,, | Performed by: DERMATOLOGY

## 2022-04-19 PROCEDURE — 99214 OFFICE O/P EST MOD 30 MIN: CPT | Mod: 25,S$GLB,, | Performed by: DERMATOLOGY

## 2022-04-19 PROCEDURE — 1159F PR MEDICATION LIST DOCUMENTED IN MEDICAL RECORD: ICD-10-PCS | Mod: CPTII,S$GLB,, | Performed by: DERMATOLOGY

## 2022-04-19 PROCEDURE — 1160F PR REVIEW ALL MEDS BY PRESCRIBER/CLIN PHARMACIST DOCUMENTED: ICD-10-PCS | Mod: CPTII,S$GLB,, | Performed by: DERMATOLOGY

## 2022-04-19 PROCEDURE — 99999 PR PBB SHADOW E&M-EST. PATIENT-LVL III: ICD-10-PCS | Mod: PBBFAC,,, | Performed by: DERMATOLOGY

## 2022-04-19 PROCEDURE — 1159F MED LIST DOCD IN RCRD: CPT | Mod: CPTII,S$GLB,, | Performed by: DERMATOLOGY

## 2022-04-19 PROCEDURE — 1126F AMNT PAIN NOTED NONE PRSNT: CPT | Mod: CPTII,S$GLB,, | Performed by: DERMATOLOGY

## 2022-04-19 PROCEDURE — 99999 PR PBB SHADOW E&M-EST. PATIENT-LVL III: CPT | Mod: PBBFAC,,, | Performed by: DERMATOLOGY

## 2022-04-19 PROCEDURE — 1157F PR ADVANCE CARE PLAN OR EQUIV PRESENT IN MEDICAL RECORD: ICD-10-PCS | Mod: CPTII,S$GLB,, | Performed by: DERMATOLOGY

## 2022-04-19 PROCEDURE — 17003 DESTRUCT PREMALG LES 2-14: CPT | Mod: S$GLB,,, | Performed by: DERMATOLOGY

## 2022-04-19 RX ORDER — ACITRETIN 10 MG/1
CAPSULE ORAL
Qty: 30 CAPSULE | Refills: 5 | Status: SHIPPED | OUTPATIENT
Start: 2022-04-19 | End: 2022-07-19 | Stop reason: SDUPTHER

## 2022-04-19 NOTE — PROGRESS NOTES
"  Subjective:       Patient ID:  Gina Mackenzie is a 66 y.o. female who presents for   Chief Complaint   Patient presents with    Nail Problem     Nails curving    Spot     Left shoulder    Psoriasis     Left foot     LOV-12/3/21-AK, SK, pustular psoriasis sole    Improved greatly on otezla, approved with prohibitive copay, financial assistance denied  Is stretching the otezla with once daily dosing - does not reach therapeutic state  Past soriatane 10mg "upset my stomach"  Could not get duobrii  Past MTX 25mg with no relief      Here today for spot on her left shoulder that is getting darker-2 months, no symptoms  Look at her nails that are curving  Look at pustular psoriasis on left foot      Derm HX:   SCC - L lower leg 2017   No fhx of MM      Current Outpatient Medications:     calcium carbonate/vitamin D3 (CALTRATE 600 + D ORAL), Take by mouth every evening., Disp: , Rfl:     cyanocobalamin, vitamin B-12, (VITAMIN B-12 ORAL), Take by mouth., Disp: , Rfl:     estradioL (ESTRACE) 0.5 MG tablet, Take 1 tablet by mouth once daily, Disp: 90 tablet, Rfl: 1    levothyroxine (EUTHYROX) 100 MCG tablet, Take 1 tablet (100 mcg total) by mouth before breakfast., Disp: 90 tablet, Rfl: 2    medroxyPROGESTERone (PROVERA) 2.5 MG tablet, Take 1 tablet by mouth once daily, Disp: 90 tablet, Rfl: 1    multivitamin (THERAGRAN) per tablet, Take 1 tablet by mouth once daily., Disp: , Rfl:     OTEZLA 30 mg Tab, Take 1 tablet by mouth twice daily., Disp: 60 tablet, Rfl: 11    vit A/vit C/vit E/zinc/copper (PRESERVISION AREDS ORAL), Take by mouth., Disp: , Rfl:       Review of Systems   Constitutional: Negative for fever, chills, fatigue and malaise.   Respiratory: Negative for cough and shortness of breath.    Skin: Positive for daily sunscreen use and activity-related sunscreen use. Negative for itching, rash, dry skin, sun sensitivity, sensitivity to antibiotic ointment, recent sunburn, dry lips and wears hat. "   Hematologic/Lymphatic: Bruises/bleeds easily.        Objective:    Physical Exam   Constitutional: She appears well-developed and well-nourished. No distress.   HENT:   Mouth/Throat: Lips normal.    Eyes: Lids are normal.    Neurological: She is alert and oriented to person, place, and time. She is not disoriented.   Psychiatric: She has a normal mood and affect. She is not agitated.   Skin:   Areas Examined (abnormalities noted in diagram):   Scalp / Hair Palpated and Inspected  Head / Face Inspection Performed  Neck Inspection Performed  Back Inspection Performed  RUE Inspected  LUE Inspection Performed  RLE Inspected  LLE Inspection Performed                       Diagram Legend     Erythematous scaling macule/papule c/w actinic keratosis       Vascular papule c/w angioma      Pigmented verrucoid papule/plaque c/w seborrheic keratosis      Yellow umbilicated papule c/w sebaceous hyperplasia      Irregularly shaped tan macule c/w lentigo     1-2 mm smooth white papules consistent with Milia      Movable subcutaneous cyst with punctum c/w epidermal inclusion cyst      Subcutaneous movable cyst c/w pilar cyst      Firm pink to brown papule c/w dermatofibroma      Pedunculated fleshy papule(s) c/w skin tag(s)      Evenly pigmented macule c/w junctional nevus     Mildly variegated pigmented, slightly irregular-bordered macule c/w mildly atypical nevus      Flesh colored to evenly pigmented papule c/w intradermal nevus       Pink pearly papule/plaque c/w basal cell carcinoma      Erythematous hyperkeratotic cursted plaque c/w SCC      Surgical scar with no sign of skin cancer recurrence      Open and closed comedones      Inflammatory papules and pustules      Verrucoid papule consistent consistent with wart     Erythematous eczematous patches and plaques     Dystrophic onycholytic nail with subungual debris c/w onychomycosis     Umbilicated papule    Erythematous-base heme-crusted tan verrucoid plaque consistent  with inflamed seborrheic keratosis     Erythematous Silvery Scaling Plaque c/w Psoriasis     See annotation       Latest Reference Range & Units 11/08/21 09:35   Cholesterol <200 mg/dL 219 (H)   HDL > OR = 50 mg/dL 73   HDL/Cholesterol Ratio <5.0 (calc) 3.0   LDL Cholesterol External mg/dL (calc) 122 (H) [1]   Non HDL Chol. (LDL+VLDL) <130 mg/dL (calc) 146 (H) [2]   Triglycerides <150 mg/dL 125      Latest Reference Range & Units 11/08/21 09:35   Alkaline Phosphatase 37 - 153 U/L 55   PROTEIN TOTAL 6.1 - 8.1 g/dL 7.3   Albumin 3.6 - 5.1 g/dL 4.3   Albumin/Globulin Ratio 1.0 - 2.5 (calc) 1.4   BILIRUBIN TOTAL 0.2 - 1.2 mg/dL 0.6   AST 10 - 35 U/L 18   ALT 6 - 29 U/L 9   Triglycerides <150 mg/dL 125   Globulin, Total 1.9 - 3.7 g/dL (calc) 3.0       Assessment / Plan:        Pustular psoriasis  -     acitretin (SORIATANE) 10 MG capsule; Take one cap PO daily with food  Dispense: 30 capsule; Refill: 5  Cleared on otezla samples  Never able to obtain drug from pharmacy- prohibitive copay, no patient assistance  Retrial of soriatane, take daily until clear, will try to space doses to lowest efficacious weekly dose  Last lipids and cmp reviewed  Plan repeat in 3 months if staying on soriatane    Actinic keratoses  Cryosurgery Procedure Note    Verbal consent from the patient is obtained and the patient is aware of the precancerous quality and need for treatment of these lesions. Liquid nitrogen cryosurgery is applied to the 2 actinic keratoses, as detailed in the physical exam, to produce a freeze injury. The patient is aware that blisters may form and is instructed on wound care with gentle cleansing and use of vaseline ointment to keep moist until healed. The patient is supplied a handout on cryosurgery and is instructed to call if lesions do not completely resolve.    Seborrheic keratoses  These are benign inherited growths without a malignant potential. Reassurance given to patient. No treatment is necessary.               No follow-ups on file.

## 2022-04-20 NOTE — TELEPHONE ENCOUNTER
They will cover methotrexate but patient failed to clear on high dose for several months. Cyclosporine is excessively aggressive for low body surface area involved. Any way to appeal PA with MTX failure history?  Won't cover otezla  Patient declined ILK  Had she not taken soriatane when we first prescribed it?  Minimal improvement with topicals only

## 2022-04-20 NOTE — TELEPHONE ENCOUNTER
----- Message from Teresa Whitley MA sent at 4/19/2022  3:09 PM CDT -----  Contact: PT  Script is to expensive, and with good rx is still to pricey is there anything else you can send in?    ----- Message -----  From: Karine Willoughby  Sent: 4/19/2022   3:05 PM CDT  To: [facility] Staff    Type: Needs Medical Advice    Who Called:Pt  Best Call Back Number:[phone]  Additional  Information: Pt calling to ask if  can call her in a cheaper prescription into the pharm. acitretin (SORIATANE) 10 MG capsule co pay his high  Please Advise- Thank you

## 2022-07-11 ENCOUNTER — HOSPITAL ENCOUNTER (OUTPATIENT)
Dept: RADIOLOGY | Facility: HOSPITAL | Age: 67
Discharge: HOME | End: 2022-07-11
Attending: NURSE PRACTITIONER
Payer: COMMERCIAL

## 2022-07-11 DIAGNOSIS — Z13.820 OSTEOPOROSIS SCREENING: ICD-10-CM

## 2022-07-11 DIAGNOSIS — Z78.0 ASYMPTOMATIC POSTMENOPAUSAL STATE: ICD-10-CM

## 2022-07-11 PROCEDURE — 77080 DEXA BONE DENSITY SPINE HIP: ICD-10-PCS | Mod: 26,,, | Performed by: RADIOLOGY

## 2022-07-11 PROCEDURE — 77080 DXA BONE DENSITY AXIAL: CPT | Mod: TC

## 2022-07-11 PROCEDURE — 77080 DXA BONE DENSITY AXIAL: CPT | Mod: 26,,, | Performed by: RADIOLOGY

## 2022-07-19 ENCOUNTER — OFFICE VISIT (OUTPATIENT)
Dept: DERMATOLOGY | Facility: CLINIC | Age: 67
End: 2022-07-19
Payer: COMMERCIAL

## 2022-07-19 VITALS — HEIGHT: 70 IN | BODY MASS INDEX: 22.76 KG/M2 | WEIGHT: 159 LBS

## 2022-07-19 DIAGNOSIS — L40.1 PUSTULAR PSORIASIS: ICD-10-CM

## 2022-07-19 DIAGNOSIS — L57.0 ACTINIC KERATOSES: Primary | ICD-10-CM

## 2022-07-19 PROCEDURE — 99214 PR OFFICE/OUTPT VISIT, EST, LEVL IV, 30-39 MIN: ICD-10-PCS | Mod: S$GLB,,, | Performed by: DERMATOLOGY

## 2022-07-19 PROCEDURE — 99999 PR PBB SHADOW E&M-EST. PATIENT-LVL III: ICD-10-PCS | Mod: PBBFAC,,, | Performed by: DERMATOLOGY

## 2022-07-19 PROCEDURE — 1160F RVW MEDS BY RX/DR IN RCRD: CPT | Mod: CPTII,S$GLB,, | Performed by: DERMATOLOGY

## 2022-07-19 PROCEDURE — 1126F AMNT PAIN NOTED NONE PRSNT: CPT | Mod: CPTII,S$GLB,, | Performed by: DERMATOLOGY

## 2022-07-19 PROCEDURE — 1101F PR PT FALLS ASSESS DOC 0-1 FALLS W/OUT INJ PAST YR: ICD-10-PCS | Mod: CPTII,S$GLB,, | Performed by: DERMATOLOGY

## 2022-07-19 PROCEDURE — 99214 OFFICE O/P EST MOD 30 MIN: CPT | Mod: S$GLB,,, | Performed by: DERMATOLOGY

## 2022-07-19 PROCEDURE — 99999 PR PBB SHADOW E&M-EST. PATIENT-LVL III: CPT | Mod: PBBFAC,,, | Performed by: DERMATOLOGY

## 2022-07-19 PROCEDURE — 1159F PR MEDICATION LIST DOCUMENTED IN MEDICAL RECORD: ICD-10-PCS | Mod: CPTII,S$GLB,, | Performed by: DERMATOLOGY

## 2022-07-19 PROCEDURE — 1101F PT FALLS ASSESS-DOCD LE1/YR: CPT | Mod: CPTII,S$GLB,, | Performed by: DERMATOLOGY

## 2022-07-19 PROCEDURE — 1159F MED LIST DOCD IN RCRD: CPT | Mod: CPTII,S$GLB,, | Performed by: DERMATOLOGY

## 2022-07-19 PROCEDURE — 3288F PR FALLS RISK ASSESSMENT DOCUMENTED: ICD-10-PCS | Mod: CPTII,S$GLB,, | Performed by: DERMATOLOGY

## 2022-07-19 PROCEDURE — 3044F PR MOST RECENT HEMOGLOBIN A1C LEVEL <7.0%: ICD-10-PCS | Mod: CPTII,S$GLB,, | Performed by: DERMATOLOGY

## 2022-07-19 PROCEDURE — 3044F HG A1C LEVEL LT 7.0%: CPT | Mod: CPTII,S$GLB,, | Performed by: DERMATOLOGY

## 2022-07-19 PROCEDURE — 1157F ADVNC CARE PLAN IN RCRD: CPT | Mod: CPTII,S$GLB,, | Performed by: DERMATOLOGY

## 2022-07-19 PROCEDURE — 3008F PR BODY MASS INDEX (BMI) DOCUMENTED: ICD-10-PCS | Mod: CPTII,S$GLB,, | Performed by: DERMATOLOGY

## 2022-07-19 PROCEDURE — 1160F PR REVIEW ALL MEDS BY PRESCRIBER/CLIN PHARMACIST DOCUMENTED: ICD-10-PCS | Mod: CPTII,S$GLB,, | Performed by: DERMATOLOGY

## 2022-07-19 PROCEDURE — 3288F FALL RISK ASSESSMENT DOCD: CPT | Mod: CPTII,S$GLB,, | Performed by: DERMATOLOGY

## 2022-07-19 PROCEDURE — 1157F PR ADVANCE CARE PLAN OR EQUIV PRESENT IN MEDICAL RECORD: ICD-10-PCS | Mod: CPTII,S$GLB,, | Performed by: DERMATOLOGY

## 2022-07-19 PROCEDURE — 1126F PR PAIN SEVERITY QUANTIFIED, NO PAIN PRESENT: ICD-10-PCS | Mod: CPTII,S$GLB,, | Performed by: DERMATOLOGY

## 2022-07-19 PROCEDURE — 3008F BODY MASS INDEX DOCD: CPT | Mod: CPTII,S$GLB,, | Performed by: DERMATOLOGY

## 2022-07-19 RX ORDER — ACITRETIN 10 MG/1
CAPSULE ORAL
Qty: 30 CAPSULE | Refills: 5 | Status: SHIPPED | OUTPATIENT
Start: 2022-07-19 | End: 2022-10-20 | Stop reason: SDUPTHER

## 2022-07-19 NOTE — PROGRESS NOTES
Subjective:       Patient ID:  Gina Mackenzie is a 66 y.o. female who presents for   Chief Complaint   Patient presents with    Psoriasis     Follow up on left foot     LOV- 4/19/22- pustular psoriasis, ak, sk    Here today for follow up on psoriasis on her left foot- shows much improvement  Takes 10mg soriatane daily, no topicals at this time    Has no hx of NMSC  Has no fhx  Of MM    Current Outpatient Medications:     acitretin (SORIATANE) 10 MG capsule, Take one cap PO daily with food, Disp: 30 capsule, Rfl: 5    busPIRone (BUSPAR) 5 MG Tab, Take 1 tablet (5 mg total) by mouth 2 (two) times daily as needed (anxiety)., Disp: 60 tablet, Rfl: 3    calcium carbonate/vitamin D3 (CALTRATE 600 + D ORAL), Take by mouth every evening., Disp: , Rfl:     chlorhexidine (PERIDEX) 0.12 % solution, RINSE 2 TEASPOONSFUL IN MOUTH THREE TIMES DAILY, Disp: , Rfl:     cyanocobalamin, vitamin B-12, (VITAMIN B-12 ORAL), Take by mouth., Disp: , Rfl:     estradioL (ESTRACE) 0.5 MG tablet, Take 1 tablet (0.5 mg total) by mouth once daily., Disp: 90 tablet, Rfl: 1    ibuprofen (ADVIL,MOTRIN) 600 MG tablet, Take 600 mg by mouth every 6 (six) hours as needed., Disp: , Rfl:     levothyroxine (EUTHYROX) 100 MCG tablet, Take 1 tablet (100 mcg total) by mouth before breakfast., Disp: 90 tablet, Rfl: 2    medroxyPROGESTERone (PROVERA) 2.5 MG tablet, Take 1 tablet (2.5 mg total) by mouth once daily., Disp: 90 tablet, Rfl: 1    multivitamin (THERAGRAN) per tablet, Take 1 tablet by mouth once daily., Disp: , Rfl:     vit A/vit C/vit E/zinc/copper (PRESERVISION AREDS ORAL), Take by mouth., Disp: , Rfl:         Review of Systems   Constitutional: Negative for fever, chills, fatigue and malaise.   Respiratory: Negative for cough and shortness of breath.    Skin: Positive for daily sunscreen use and activity-related sunscreen use. Negative for itching, rash, dry skin, sun sensitivity, sensitivity to antibiotic ointment, recent sunburn,  dry lips and wears hat.   Hematologic/Lymphatic: Bruises/bleeds easily.        Objective:    Physical Exam   Constitutional: She appears well-developed and well-nourished. No distress.   HENT:   Mouth/Throat: Lips normal.    Eyes: Lids are normal.    Neurological: She is alert and oriented to person, place, and time. She is not disoriented.   Psychiatric: She has a normal mood and affect. She is not agitated.   Skin:   Areas Examined (abnormalities noted in diagram):   Back Inspection Performed  RUE Inspected  LUE Inspection Performed  RLE Inspected  LLE Inspection Performed                           Diagram Legend     Erythematous scaling macule/papule c/w actinic keratosis       Vascular papule c/w angioma      Pigmented verrucoid papule/plaque c/w seborrheic keratosis      Yellow umbilicated papule c/w sebaceous hyperplasia      Irregularly shaped tan macule c/w lentigo     1-2 mm smooth white papules consistent with Milia      Movable subcutaneous cyst with punctum c/w epidermal inclusion cyst      Subcutaneous movable cyst c/w pilar cyst      Firm pink to brown papule c/w dermatofibroma      Pedunculated fleshy papule(s) c/w skin tag(s)      Evenly pigmented macule c/w junctional nevus     Mildly variegated pigmented, slightly irregular-bordered macule c/w mildly atypical nevus      Flesh colored to evenly pigmented papule c/w intradermal nevus       Pink pearly papule/plaque c/w basal cell carcinoma      Erythematous hyperkeratotic cursted plaque c/w SCC      Surgical scar with no sign of skin cancer recurrence      Open and closed comedones      Inflammatory papules and pustules      Verrucoid papule consistent consistent with wart     Erythematous eczematous patches and plaques     Dystrophic onycholytic nail with subungual debris c/w onychomycosis     Umbilicated papule    Erythematous-base heme-crusted tan verrucoid plaque consistent with inflamed seborrheic keratosis     Erythematous Silvery Scaling  Plaque c/w Psoriasis     See annotation      Assessment / Plan:        Actinic keratoses  Cryosurgery Procedure Note    Verbal consent from the patient is obtained and the patient is aware of the precancerous quality and need for treatment of these lesions. Liquid nitrogen cryosurgery is applied to the 1 actinic keratoses, as detailed in the physical exam, to produce a freeze injury. The patient is aware that blisters may form and is instructed on wound care with gentle cleansing and use of vaseline ointment to keep moist until healed. The patient is supplied a handout on cryosurgery and is instructed to call if lesions do not completely resolve.    Pustular psoriasis  -     acitretin (SORIATANE) 10 MG capsule; Take one cap PO daily with food  Dispense: 30 capsule; Refill: 5    Repeat labs (LFTs and FLP) in 3 months  May attempt to space doses QOD to lower cost (currently $250 with [facility], $299 copay with insurance!)  WOuld love to try VTAMA (new topical) for treatment of plaque psoriasis but unlikely to get covered in medicare patient           No follow-ups on file.

## 2022-10-18 ENCOUNTER — HOSPITAL ENCOUNTER (OUTPATIENT)
Dept: RADIOLOGY | Facility: HOSPITAL | Age: 67
Discharge: HOME | End: 2022-10-18
Attending: NURSE PRACTITIONER
Payer: COMMERCIAL

## 2022-10-18 VITALS — HEIGHT: 70 IN | WEIGHT: 158.94 LBS | BODY MASS INDEX: 22.75 KG/M2

## 2022-10-18 DIAGNOSIS — Z12.31 ENCOUNTER FOR SCREENING MAMMOGRAM FOR MALIGNANT NEOPLASM OF BREAST: ICD-10-CM

## 2022-10-18 PROCEDURE — 77067 MAMMO DIGITAL SCREENING BILAT WITH TOMO: ICD-10-PCS | Mod: 26,,, | Performed by: RADIOLOGY

## 2022-10-18 PROCEDURE — 77063 MAMMO DIGITAL SCREENING BILAT WITH TOMO: ICD-10-PCS | Mod: 26,,, | Performed by: RADIOLOGY

## 2022-10-18 PROCEDURE — 77067 SCR MAMMO BI INCL CAD: CPT | Mod: TC

## 2022-10-18 PROCEDURE — 77063 BREAST TOMOSYNTHESIS BI: CPT | Mod: TC

## 2022-10-18 PROCEDURE — 77063 BREAST TOMOSYNTHESIS BI: CPT | Mod: 26,,, | Performed by: RADIOLOGY

## 2022-10-18 PROCEDURE — 77067 SCR MAMMO BI INCL CAD: CPT | Mod: 26,,, | Performed by: RADIOLOGY

## 2022-10-20 ENCOUNTER — OFFICE VISIT (OUTPATIENT)
Dept: DERMATOLOGY | Facility: CLINIC | Age: 67
End: 2022-10-20
Payer: COMMERCIAL

## 2022-10-20 VITALS — WEIGHT: 158 LBS | HEIGHT: 70 IN | BODY MASS INDEX: 22.62 KG/M2

## 2022-10-20 DIAGNOSIS — L40.1 PUSTULAR PSORIASIS: ICD-10-CM

## 2022-10-20 PROCEDURE — 1126F PR PAIN SEVERITY QUANTIFIED, NO PAIN PRESENT: ICD-10-PCS | Mod: CPTII,S$GLB,, | Performed by: DERMATOLOGY

## 2022-10-20 PROCEDURE — 1159F MED LIST DOCD IN RCRD: CPT | Mod: CPTII,S$GLB,, | Performed by: DERMATOLOGY

## 2022-10-20 PROCEDURE — 3044F HG A1C LEVEL LT 7.0%: CPT | Mod: CPTII,S$GLB,, | Performed by: DERMATOLOGY

## 2022-10-20 PROCEDURE — 1160F RVW MEDS BY RX/DR IN RCRD: CPT | Mod: CPTII,S$GLB,, | Performed by: DERMATOLOGY

## 2022-10-20 PROCEDURE — 1126F AMNT PAIN NOTED NONE PRSNT: CPT | Mod: CPTII,S$GLB,, | Performed by: DERMATOLOGY

## 2022-10-20 PROCEDURE — 1157F PR ADVANCE CARE PLAN OR EQUIV PRESENT IN MEDICAL RECORD: ICD-10-PCS | Mod: CPTII,S$GLB,, | Performed by: DERMATOLOGY

## 2022-10-20 PROCEDURE — 1101F PT FALLS ASSESS-DOCD LE1/YR: CPT | Mod: CPTII,S$GLB,, | Performed by: DERMATOLOGY

## 2022-10-20 PROCEDURE — 1160F PR REVIEW ALL MEDS BY PRESCRIBER/CLIN PHARMACIST DOCUMENTED: ICD-10-PCS | Mod: CPTII,S$GLB,, | Performed by: DERMATOLOGY

## 2022-10-20 PROCEDURE — 1159F PR MEDICATION LIST DOCUMENTED IN MEDICAL RECORD: ICD-10-PCS | Mod: CPTII,S$GLB,, | Performed by: DERMATOLOGY

## 2022-10-20 PROCEDURE — 3288F FALL RISK ASSESSMENT DOCD: CPT | Mod: CPTII,S$GLB,, | Performed by: DERMATOLOGY

## 2022-10-20 PROCEDURE — 99214 PR OFFICE/OUTPT VISIT, EST, LEVL IV, 30-39 MIN: ICD-10-PCS | Mod: S$GLB,,, | Performed by: DERMATOLOGY

## 2022-10-20 PROCEDURE — 99999 PR PBB SHADOW E&M-EST. PATIENT-LVL III: CPT | Mod: PBBFAC,,, | Performed by: DERMATOLOGY

## 2022-10-20 PROCEDURE — 1157F ADVNC CARE PLAN IN RCRD: CPT | Mod: CPTII,S$GLB,, | Performed by: DERMATOLOGY

## 2022-10-20 PROCEDURE — 99214 OFFICE O/P EST MOD 30 MIN: CPT | Mod: S$GLB,,, | Performed by: DERMATOLOGY

## 2022-10-20 PROCEDURE — 3288F PR FALLS RISK ASSESSMENT DOCUMENTED: ICD-10-PCS | Mod: CPTII,S$GLB,, | Performed by: DERMATOLOGY

## 2022-10-20 PROCEDURE — 1101F PR PT FALLS ASSESS DOC 0-1 FALLS W/OUT INJ PAST YR: ICD-10-PCS | Mod: CPTII,S$GLB,, | Performed by: DERMATOLOGY

## 2022-10-20 PROCEDURE — 99999 PR PBB SHADOW E&M-EST. PATIENT-LVL III: ICD-10-PCS | Mod: PBBFAC,,, | Performed by: DERMATOLOGY

## 2022-10-20 PROCEDURE — 3044F PR MOST RECENT HEMOGLOBIN A1C LEVEL <7.0%: ICD-10-PCS | Mod: CPTII,S$GLB,, | Performed by: DERMATOLOGY

## 2022-10-20 RX ORDER — ACITRETIN 10 MG/1
CAPSULE ORAL
Qty: 60 CAPSULE | Refills: 5 | Status: SHIPPED | OUTPATIENT
Start: 2022-10-20 | End: 2022-10-20

## 2022-10-20 RX ORDER — ACITRETIN 10 MG/1
CAPSULE ORAL
Qty: 30 CAPSULE | Refills: 5 | Status: SHIPPED | OUTPATIENT
Start: 2022-10-20 | End: 2023-01-19

## 2022-10-20 NOTE — PROGRESS NOTES
Subjective:       Patient ID:  Gina Mackenzie is a 67 y.o. female who presents for   Chief Complaint   Patient presents with    Psoriasis     Follow up      LOV- 7/19/22, pustular psoriasis    Here today for a f/u on psoriasis on her left foot  Feels the area has improved  Taking acitretin 10 every other day,, cannot stretch further or flares  States that she will need refill on acitretin    Derm HX:   SCC - L lower leg 2017   No fhx of MM    Current Outpatient Medications:   ·  acitretin (SORIATANE) 10 MG capsule, Take one cap PO daily with food, Disp: 30 capsule, Rfl: 5  ·  busPIRone (BUSPAR) 5 MG Tab, Take 1 tablet (5 mg total) by mouth 2 (two) times daily as needed (anxiety)., Disp: 60 tablet, Rfl: 3  ·  calcium carbonate/vitamin D3 (CALTRATE 600 + D ORAL), Take by mouth every evening., Disp: , Rfl:   ·  chlorhexidine (PERIDEX) 0.12 % solution, RINSE 2 TEASPOONSFUL IN MOUTH THREE TIMES DAILY, Disp: , Rfl:   ·  cyanocobalamin, vitamin B-12, (VITAMIN B-12 ORAL), Take by mouth., Disp: , Rfl:   ·  estradioL (ESTRACE) 0.5 MG tablet, Take 1 tablet (0.5 mg total) by mouth once daily., Disp: 90 tablet, Rfl: 1  ·  ibuprofen (ADVIL,MOTRIN) 600 MG tablet, Take 600 mg by mouth every 6 (six) hours as needed., Disp: , Rfl:   ·  levothyroxine (EUTHYROX) 100 MCG tablet, Take 1 tablet (100 mcg total) by mouth before breakfast., Disp: 90 tablet, Rfl: 2  ·  medroxyPROGESTERone (PROVERA) 2.5 MG tablet, Take 1 tablet (2.5 mg total) by mouth once daily., Disp: 90 tablet, Rfl: 1  ·  multivitamin (THERAGRAN) per tablet, Take 1 tablet by mouth once daily., Disp: , Rfl:   ·  vit A/vit C/vit E/zinc/copper (PRESERVISION AREDS ORAL), Take by mouth., Disp: , Rfl:   ·  valACYclovir (VALTREX) 1000 MG tablet, Take 1 tablet (1,000 mg total) by mouth every 12 (twelve) hours. for 5 days, Disp: 20 tablet, Rfl: 1        Review of Systems   Constitutional:  Negative for fever, chills, fatigue and malaise.   Respiratory:  Negative for cough and  shortness of breath.    Skin:  Positive for daily sunscreen use and activity-related sunscreen use. Negative for itching, rash, dry skin, sun sensitivity, sensitivity to antibiotic ointment, recent sunburn, dry lips and wears hat.   Hematologic/Lymphatic: Bruises/bleeds easily.      Objective:    Physical Exam   Constitutional: She appears well-developed and well-nourished. No distress.   HENT:   Mouth/Throat: Lips normal.    Eyes: Lids are normal.    Neurological: She is alert and oriented to person, place, and time. She is not disoriented.   Psychiatric: She has a normal mood and affect. She is not agitated.   Skin:   Areas Examined (abnormalities noted in diagram):   Back Inspection Performed  RUE Inspected  LUE Inspection Performed  RLE Inspected  LLE Inspection Performed                         Diagram Legend     Erythematous scaling macule/papule c/w actinic keratosis       Vascular papule c/w angioma      Pigmented verrucoid papule/plaque c/w seborrheic keratosis      Yellow umbilicated papule c/w sebaceous hyperplasia      Irregularly shaped tan macule c/w lentigo     1-2 mm smooth white papules consistent with Milia      Movable subcutaneous cyst with punctum c/w epidermal inclusion cyst      Subcutaneous movable cyst c/w pilar cyst      Firm pink to brown papule c/w dermatofibroma      Pedunculated fleshy papule(s) c/w skin tag(s)      Evenly pigmented macule c/w junctional nevus     Mildly variegated pigmented, slightly irregular-bordered macule c/w mildly atypical nevus      Flesh colored to evenly pigmented papule c/w intradermal nevus       Pink pearly papule/plaque c/w basal cell carcinoma      Erythematous hyperkeratotic cursted plaque c/w SCC      Surgical scar with no sign of skin cancer recurrence      Open and closed comedones      Inflammatory papules and pustules      Verrucoid papule consistent consistent with wart     Erythematous eczematous patches and plaques     Dystrophic onycholytic nail  with subungual debris c/w onychomycosis     Umbilicated papule    Erythematous-base heme-crusted tan verrucoid plaque consistent with inflamed seborrheic keratosis     Erythematous Silvery Scaling Plaque c/w Psoriasis     See annotation      Assessment / Plan:        Pustular psoriasis  -     Discontinue: acitretin (SORIATANE) 10 MG capsule; Take one cap PO daily with food  Dispense: 60 capsule; Refill: 5  -     acitretin (SORIATANE) 10 MG capsule; Take one cap PO daily with food  Dispense: 30 capsule; Refill: 5    Last labs 5/2022, planned repeat 11/2022 with PCP Dr Cedeno  Manages to control suboptimally with 10mg QOD, pays cash with good RX dur to non coverage  Would be perfect candidate for VTAMA topcial given small surface area involved    Failed potent topical steroids and calcipotriene           No follow-ups on file.

## 2022-10-25 ENCOUNTER — TELEPHONE (OUTPATIENT)
Dept: DERMATOLOGY | Facility: CLINIC | Age: 67
End: 2022-10-25
Payer: MEDICARE

## 2022-12-13 ENCOUNTER — HOSPITAL ENCOUNTER (OUTPATIENT)
Dept: RADIOLOGY | Facility: HOSPITAL | Age: 67
Discharge: HOME | End: 2022-12-13
Payer: COMMERCIAL

## 2022-12-13 DIAGNOSIS — N63.0 BREAST SWELLING: ICD-10-CM

## 2022-12-13 PROCEDURE — 76642 US BREAST RIGHT LIMITED: ICD-10-PCS | Mod: 26,RT,, | Performed by: RADIOLOGY

## 2022-12-13 PROCEDURE — 76642 ULTRASOUND BREAST LIMITED: CPT | Mod: 26,RT,, | Performed by: RADIOLOGY

## 2022-12-13 PROCEDURE — 76642 ULTRASOUND BREAST LIMITED: CPT | Mod: TC,RT

## 2022-12-19 PROBLEM — Z87.891 HISTORY OF TOBACCO ABUSE: Status: RESOLVED | Noted: 2019-04-15 | Resolved: 2022-12-19

## 2022-12-19 PROBLEM — D72.819 LEUKOPENIA: Status: RESOLVED | Noted: 2019-11-01 | Resolved: 2022-12-19

## 2022-12-19 PROBLEM — M25.612 DECREASED ROM OF LEFT SHOULDER: Status: RESOLVED | Noted: 2019-05-06 | Resolved: 2022-12-19

## 2023-02-02 RX ORDER — ACITRETIN 10 MG/1
CAPSULE ORAL
Qty: 30 CAPSULE | Refills: 5 | Status: SHIPPED | OUTPATIENT
Start: 2023-02-02 | End: 2023-04-20 | Stop reason: SDUPTHER

## 2023-04-20 ENCOUNTER — OFFICE VISIT (OUTPATIENT)
Dept: DERMATOLOGY | Facility: CLINIC | Age: 68
End: 2023-04-20
Payer: MEDICARE

## 2023-04-20 VITALS — WEIGHT: 155 LBS | BODY MASS INDEX: 22.19 KG/M2 | HEIGHT: 70 IN

## 2023-04-20 DIAGNOSIS — L40.1 PUSTULAR PSORIASIS: ICD-10-CM

## 2023-04-20 DIAGNOSIS — B00.9 HERPES SIMPLEX INFECTION: ICD-10-CM

## 2023-04-20 DIAGNOSIS — H02.729 HYPOTRICHOSIS OF EYELID, UNSPECIFIED LATERALITY: Primary | ICD-10-CM

## 2023-04-20 PROCEDURE — 99214 PR OFFICE/OUTPT VISIT, EST, LEVL IV, 30-39 MIN: ICD-10-PCS | Mod: S$GLB,,, | Performed by: DERMATOLOGY

## 2023-04-20 PROCEDURE — 1159F MED LIST DOCD IN RCRD: CPT | Mod: CPTII,S$GLB,, | Performed by: DERMATOLOGY

## 2023-04-20 PROCEDURE — 1159F PR MEDICATION LIST DOCUMENTED IN MEDICAL RECORD: ICD-10-PCS | Mod: CPTII,S$GLB,, | Performed by: DERMATOLOGY

## 2023-04-20 PROCEDURE — 3008F PR BODY MASS INDEX (BMI) DOCUMENTED: ICD-10-PCS | Mod: CPTII,S$GLB,, | Performed by: DERMATOLOGY

## 2023-04-20 PROCEDURE — 99999 PR PBB SHADOW E&M-EST. PATIENT-LVL II: CPT | Mod: PBBFAC,,, | Performed by: DERMATOLOGY

## 2023-04-20 PROCEDURE — 99214 OFFICE O/P EST MOD 30 MIN: CPT | Mod: S$GLB,,, | Performed by: DERMATOLOGY

## 2023-04-20 PROCEDURE — 1101F PT FALLS ASSESS-DOCD LE1/YR: CPT | Mod: CPTII,S$GLB,, | Performed by: DERMATOLOGY

## 2023-04-20 PROCEDURE — 1157F PR ADVANCE CARE PLAN OR EQUIV PRESENT IN MEDICAL RECORD: ICD-10-PCS | Mod: CPTII,S$GLB,, | Performed by: DERMATOLOGY

## 2023-04-20 PROCEDURE — 1160F PR REVIEW ALL MEDS BY PRESCRIBER/CLIN PHARMACIST DOCUMENTED: ICD-10-PCS | Mod: CPTII,S$GLB,, | Performed by: DERMATOLOGY

## 2023-04-20 PROCEDURE — 3008F BODY MASS INDEX DOCD: CPT | Mod: CPTII,S$GLB,, | Performed by: DERMATOLOGY

## 2023-04-20 PROCEDURE — 1160F RVW MEDS BY RX/DR IN RCRD: CPT | Mod: CPTII,S$GLB,, | Performed by: DERMATOLOGY

## 2023-04-20 PROCEDURE — 3288F FALL RISK ASSESSMENT DOCD: CPT | Mod: CPTII,S$GLB,, | Performed by: DERMATOLOGY

## 2023-04-20 PROCEDURE — 1101F PR PT FALLS ASSESS DOC 0-1 FALLS W/OUT INJ PAST YR: ICD-10-PCS | Mod: CPTII,S$GLB,, | Performed by: DERMATOLOGY

## 2023-04-20 PROCEDURE — 99999 PR PBB SHADOW E&M-EST. PATIENT-LVL II: ICD-10-PCS | Mod: PBBFAC,,, | Performed by: DERMATOLOGY

## 2023-04-20 PROCEDURE — 3288F PR FALLS RISK ASSESSMENT DOCUMENTED: ICD-10-PCS | Mod: CPTII,S$GLB,, | Performed by: DERMATOLOGY

## 2023-04-20 PROCEDURE — 1157F ADVNC CARE PLAN IN RCRD: CPT | Mod: CPTII,S$GLB,, | Performed by: DERMATOLOGY

## 2023-04-20 RX ORDER — BIMATOPROST 3 UG/ML
SOLUTION TOPICAL
Qty: 5 ML | Refills: 4 | Status: SHIPPED | OUTPATIENT
Start: 2023-04-20

## 2023-04-20 RX ORDER — ACITRETIN 10 MG/1
CAPSULE ORAL
Qty: 30 CAPSULE | Refills: 11 | Status: SHIPPED | OUTPATIENT
Start: 2023-04-20 | End: 2023-10-20 | Stop reason: SDUPTHER

## 2023-04-20 RX ORDER — VALACYCLOVIR HYDROCHLORIDE 1 G/1
TABLET, FILM COATED ORAL
Qty: 40 TABLET | Refills: 1 | Status: SHIPPED | OUTPATIENT
Start: 2023-04-20

## 2023-04-20 NOTE — PROGRESS NOTES
Subjective:      Patient ID:  Gina Mackenzie is a 67 y.o. female who presents for   Chief Complaint   Patient presents with    Psoriasis     LOV 10/20/22 Pustular Psoriasis    Patient here for follow up of psoriasis left foot.  Patient states psoriasis is clear at this moment.  On soriatane 10mg daily    Failed hal thurman    Derm HX:   SCC - L lower leg 2017   No fhx of MM    Current Outpatient Medications:   ·  acitretin (SORIATANE) 10 MG capsule, Take one cap PO daily with food, Disp: 30 capsule, Rfl: 5  ·  busPIRone (BUSPAR) 10 MG tablet, Take 1 tablet (10 mg total) by mouth 2 (two) times daily., Disp: 180 tablet, Rfl: 1  ·  cyanocobalamin, vitamin B-12, (VITAMIN B-12 ORAL), Take by mouth., Disp: , Rfl:   ·  estradioL (ESTRACE) 0.01 % (0.1 mg/gram) vaginal cream, INSERT 1 GRAM VAGINALLY ONCE DAILY FOR 14 DAYS, THEN 1 GRAM TWICE A WEEK, Disp: 43 g, Rfl: 0  ·  estradioL (ESTRACE) 0.5 MG tablet, Take 1 tablet (0.5 mg total) by mouth once daily., Disp: 90 tablet, Rfl: 1  ·  levothyroxine (EUTHYROX) 100 MCG tablet, Take 1 tablet (100 mcg total) by mouth before breakfast., Disp: 90 tablet, Rfl: 2  ·  medroxyPROGESTERone (PROVERA) 2.5 MG tablet, Take 1 tablet (2.5 mg total) by mouth once daily., Disp: 90 tablet, Rfl: 1  ·  multivitamin (THERAGRAN) per tablet, Take 1 tablet by mouth once daily., Disp: , Rfl:   ·  vit A/vit C/vit E/zinc/copper (PRESERVISION AREDS ORAL), Take by mouth., Disp: , Rfl:   ·  valACYclovir (VALTREX) 1000 MG tablet, Take 1 tablet (1,000 mg total) by mouth every 12 (twelve) hours. for 5 days, Disp: 20 tablet, Rfl: 1       Review of Systems   Constitutional:  Negative for fever, chills, fatigue and malaise.   Respiratory:  Negative for cough and shortness of breath.    Skin:  Positive for daily sunscreen use and activity-related sunscreen use. Negative for itching, rash, dry skin, sun sensitivity, sensitivity to antibiotic ointment, recent sunburn, dry lips and wears hat.    Hematologic/Lymphatic: Bruises/bleeds easily.     Objective:   Physical Exam   Constitutional: She appears well-developed and well-nourished. No distress.   HENT:   Mouth/Throat: Lips normal.    Eyes: Lids are normal.    Neurological: She is alert and oriented to person, place, and time. She is not disoriented.   Psychiatric: She has a normal mood and affect. She is not agitated.   Skin:   Areas Examined (abnormalities noted in diagram):   Back Inspection Performed  RUE Inspected  LUE Inspection Performed  RLE Inspected  LLE Inspection Performed                       Diagram Legend     Erythematous scaling macule/papule c/w actinic keratosis       Vascular papule c/w angioma      Pigmented verrucoid papule/plaque c/w seborrheic keratosis      Yellow umbilicated papule c/w sebaceous hyperplasia      Irregularly shaped tan macule c/w lentigo     1-2 mm smooth white papules consistent with Milia      Movable subcutaneous cyst with punctum c/w epidermal inclusion cyst      Subcutaneous movable cyst c/w pilar cyst      Firm pink to brown papule c/w dermatofibroma      Pedunculated fleshy papule(s) c/w skin tag(s)      Evenly pigmented macule c/w junctional nevus     Mildly variegated pigmented, slightly irregular-bordered macule c/w mildly atypical nevus      Flesh colored to evenly pigmented papule c/w intradermal nevus       Pink pearly papule/plaque c/w basal cell carcinoma      Erythematous hyperkeratotic cursted plaque c/w SCC      Surgical scar with no sign of skin cancer recurrence      Open and closed comedones      Inflammatory papules and pustules      Verrucoid papule consistent consistent with wart     Erythematous eczematous patches and plaques     Dystrophic onycholytic nail with subungual debris c/w onychomycosis     Umbilicated papule    Erythematous-base heme-crusted tan verrucoid plaque consistent with inflamed seborrheic keratosis     Erythematous Silvery Scaling Plaque c/w Psoriasis     See  [facility]     Latest Reference Range & Units 12/06/22 14:19   WBC 3.8 - 10.8 Thousand/uL 4.5   RBC 3.80 - 5.10 Million/uL 4.31   Hemoglobin 11.7 - 15.5 g/dL 13.7   Hematocrit 35.0 - 45.0 % 39.4   MCV 80.0 - 100.0 fL 91.4   MCH 27.0 - 33.0 pg 31.8   MCHC 32.0 - 36.0 g/dL 34.8   RDW 11.0 - 15.0 % 11.5   Platelets 140 - 400 Thousand/uL 220   MPV 7.5 - 12.5 fL 11.4   Neutrophils Relative % 57.4   Lymph % % 31.1   Mono % % 8.8   Eosinophil % % 1.8   Basophil % % 0.9   Neutrophils, Abs 1,500 - 7,800 cells/uL 2,583   Lymph # 850 - 3,900 cells/uL 1,400   Mono # 200 - 950 cells/uL 396   Eos # 15 - 500 cells/uL 81   Baso # 0 - 200 cells/uL 41   Sodium 135 - 146 mmol/L 137   Potassium 3.5 - 5.3 mmol/L 5.0   Chloride 98 - 110 mmol/L 100   CO2 20 - 32 mmol/L 31   BUN 7 - 25 mg/dL 21   Creatinine 0.50 - 1.05 mg/dL 1.04   BUN/CREAT RATIO 6 - 22 (calc) NOT APPLICABLE   eGFR > OR = 60 mL/min/1.73m2 59 (L)   Glucose 65 - 139 mg/dL 88   Calcium 8.6 - 10.4 mg/dL 9.1   Alkaline Phosphatase 37 - 153 U/L 60   PROTEIN TOTAL 6.1 - 8.1 g/dL 6.9   Albumin 3.6 - 5.1 g/dL 4.2   Albumin/Globulin Ratio 1.0 - 2.5 (calc) 1.6   BILIRUBIN TOTAL 0.2 - 1.2 mg/dL 0.5   AST 10 - 35 U/L 15   ALT 6 - 29 U/L 7   Globulin, Total 1.9 - 3.7 g/dL (calc) 2.7   Cholesterol <200 mg/dL 215 (H)   HDL > OR = 50 mg/dL 52   HDL/Cholesterol Ratio <5.0 (calc) 4.1   LDL Cholesterol External mg/dL (calc) 140 (H)   Non HDL Chol. (LDL+VLDL) <130 mg/dL (calc) 163 (H)   Triglycerides <150 mg/dL 114   Hemoglobin A1C External <5.7 % of total Hgb 5.1   TSH 0.40 - 4.50 mIU/L 3.60   T3, Free 2.3 - 4.2 pg/mL 3.1   T4, Free 0.8 - 1.8 ng/dL 1.2   EAG (MG/DL) mg/dL 100   EAG (MMOL/L) mmol/L 5.5   (L): Data is abnormally low  (H): Data is abnormally high  Assessment / Plan:        Hypotrichosis of eyelid, unspecified laterality  -     bimatoprost (LATISSE) 0.03 % ophthalmic solution; Place one drop on applicator and apply evenly along the skin of the upper eyelid at base of eyelashes  once daily at bedtime; repeat procedure for second eye (use a clean applicator).  Dispense: 5 mL; Refill: 4    Pustular psoriasis  -     acitretin (SORIATANE) 10 MG capsule; Take one cap PO daily with food  Dispense: 30 capsule; Refill: 11  Recent labs reviewed and reassuring  Continue daily 10mg  Has clobetasol for breakthrough flares  See overview for past tx failures     Herpes simplex infection  -     valACYclovir (VALTREX) 1000 MG tablet; Take 1 cap PO BID for 5 days PRN flare,  Dispense: 40 tablet; Refill: 1  Left buttock, current flare  Average 4 flares per year           No follow-ups on file.

## 2023-06-19 PROBLEM — F41.9 ANXIETY AND DEPRESSION: Status: ACTIVE | Noted: 2019-02-07

## 2023-06-19 PROBLEM — E78.2 MODERATE MIXED HYPERLIPIDEMIA NOT REQUIRING STATIN THERAPY: Status: ACTIVE | Noted: 2018-04-05

## 2023-10-06 DIAGNOSIS — M25.531 RIGHT WRIST PAIN: Primary | ICD-10-CM

## 2023-10-13 ENCOUNTER — OFFICE VISIT (OUTPATIENT)
Dept: ORTHOPEDICS | Facility: CLINIC | Age: 68
End: 2023-10-13
Payer: MEDICARE

## 2023-10-13 ENCOUNTER — HOSPITAL ENCOUNTER (OUTPATIENT)
Dept: RADIOLOGY | Facility: HOSPITAL | Age: 68
Discharge: HOME | End: 2023-10-13
Attending: ORTHOPAEDIC SURGERY
Payer: MEDICARE

## 2023-10-13 VITALS — HEIGHT: 70 IN | WEIGHT: 153 LBS | HEART RATE: 69 BPM | BODY MASS INDEX: 21.9 KG/M2 | OXYGEN SATURATION: 96 %

## 2023-10-13 DIAGNOSIS — M25.531 RIGHT WRIST PAIN: ICD-10-CM

## 2023-10-13 DIAGNOSIS — M79.644 CHRONIC PAIN OF RIGHT THUMB: ICD-10-CM

## 2023-10-13 DIAGNOSIS — M18.11 ARTHRITIS OF CARPOMETACARPAL (CMC) JOINT OF RIGHT THUMB: Primary | ICD-10-CM

## 2023-10-13 DIAGNOSIS — G89.29 CHRONIC PAIN OF RIGHT THUMB: ICD-10-CM

## 2023-10-13 PROCEDURE — 3288F FALL RISK ASSESSMENT DOCD: CPT | Mod: CPTII,S$GLB,, | Performed by: ORTHOPAEDIC SURGERY

## 2023-10-13 PROCEDURE — 1159F MED LIST DOCD IN RCRD: CPT | Mod: CPTII,S$GLB,, | Performed by: ORTHOPAEDIC SURGERY

## 2023-10-13 PROCEDURE — 3044F HG A1C LEVEL LT 7.0%: CPT | Mod: CPTII,S$GLB,, | Performed by: ORTHOPAEDIC SURGERY

## 2023-10-13 PROCEDURE — 1101F PT FALLS ASSESS-DOCD LE1/YR: CPT | Mod: CPTII,S$GLB,, | Performed by: ORTHOPAEDIC SURGERY

## 2023-10-13 PROCEDURE — 3288F PR FALLS RISK ASSESSMENT DOCUMENTED: ICD-10-PCS | Mod: CPTII,S$GLB,, | Performed by: ORTHOPAEDIC SURGERY

## 2023-10-13 PROCEDURE — 20600 DRAIN/INJ JOINT/BURSA W/O US: CPT | Mod: RT,S$GLB,, | Performed by: ORTHOPAEDIC SURGERY

## 2023-10-13 PROCEDURE — 73110 X-RAY EXAM OF WRIST: CPT | Mod: TC,PN,RT

## 2023-10-13 PROCEDURE — 1157F ADVNC CARE PLAN IN RCRD: CPT | Mod: CPTII,S$GLB,, | Performed by: ORTHOPAEDIC SURGERY

## 2023-10-13 PROCEDURE — 99203 OFFICE O/P NEW LOW 30 MIN: CPT | Mod: 25,S$GLB,, | Performed by: ORTHOPAEDIC SURGERY

## 2023-10-13 PROCEDURE — 73110 X-RAY EXAM OF WRIST: CPT | Mod: 26,RT,, | Performed by: RADIOLOGY

## 2023-10-13 PROCEDURE — 1157F PR ADVANCE CARE PLAN OR EQUIV PRESENT IN MEDICAL RECORD: ICD-10-PCS | Mod: CPTII,S$GLB,, | Performed by: ORTHOPAEDIC SURGERY

## 2023-10-13 PROCEDURE — 1159F PR MEDICATION LIST DOCUMENTED IN MEDICAL RECORD: ICD-10-PCS | Mod: CPTII,S$GLB,, | Performed by: ORTHOPAEDIC SURGERY

## 2023-10-13 PROCEDURE — 73110 XR WRIST COMPLETE 3 VIEWS RIGHT: ICD-10-PCS | Mod: 26,RT,, | Performed by: RADIOLOGY

## 2023-10-13 PROCEDURE — 1125F PR PAIN SEVERITY QUANTIFIED, PAIN PRESENT: ICD-10-PCS | Mod: CPTII,S$GLB,, | Performed by: ORTHOPAEDIC SURGERY

## 2023-10-13 PROCEDURE — 1101F PR PT FALLS ASSESS DOC 0-1 FALLS W/OUT INJ PAST YR: ICD-10-PCS | Mod: CPTII,S$GLB,, | Performed by: ORTHOPAEDIC SURGERY

## 2023-10-13 PROCEDURE — 3044F PR MOST RECENT HEMOGLOBIN A1C LEVEL <7.0%: ICD-10-PCS | Mod: CPTII,S$GLB,, | Performed by: ORTHOPAEDIC SURGERY

## 2023-10-13 PROCEDURE — 99203 PR OFFICE/OUTPT VISIT, NEW, LEVL III, 30-44 MIN: ICD-10-PCS | Mod: 25,S$GLB,, | Performed by: ORTHOPAEDIC SURGERY

## 2023-10-13 PROCEDURE — 3008F PR BODY MASS INDEX (BMI) DOCUMENTED: ICD-10-PCS | Mod: CPTII,S$GLB,, | Performed by: ORTHOPAEDIC SURGERY

## 2023-10-13 PROCEDURE — 1125F AMNT PAIN NOTED PAIN PRSNT: CPT | Mod: CPTII,S$GLB,, | Performed by: ORTHOPAEDIC SURGERY

## 2023-10-13 PROCEDURE — 99999 PR PBB SHADOW E&M-EST. PATIENT-LVL III: CPT | Mod: PBBFAC,,, | Performed by: ORTHOPAEDIC SURGERY

## 2023-10-13 PROCEDURE — 20600 SMALL JOINT ASPIRATION/INJECTION: R THUMB CMC: ICD-10-PCS | Mod: RT,S$GLB,, | Performed by: ORTHOPAEDIC SURGERY

## 2023-10-13 PROCEDURE — 3008F BODY MASS INDEX DOCD: CPT | Mod: CPTII,S$GLB,, | Performed by: ORTHOPAEDIC SURGERY

## 2023-10-13 PROCEDURE — 99999 PR PBB SHADOW E&M-EST. PATIENT-LVL III: ICD-10-PCS | Mod: PBBFAC,,, | Performed by: ORTHOPAEDIC SURGERY

## 2023-10-13 NOTE — PROCEDURES
Small Joint Aspiration/Injection: R thumb CMC    Date/Time: 10/13/2023 9:00 AM    Performed by: Skyler Phoenix DO  Authorized by: Skyler Phoenix, DO    Consent Done?:  Yes (Verbal)  Indications:  Arthritis, diagnostic evaluation, joint swelling and pain  Site marked: the procedure site was marked    Timeout: prior to procedure the correct patient, procedure, and site was verified    Prep: patient was prepped and draped in usual sterile fashion      Location:  Thumb  Site:  R thumb CMC  Ultrasonic guidance for needle placement?: No    Needle size:  25 G  Approach:  Dorsal  Medications:  10 mg triamcinolone acetonide 10 mg/mL  Patient tolerance:  Patient tolerated the procedure well with no immediate complications

## 2023-10-13 NOTE — PROGRESS NOTES
Patient ID: Gina Mackenzie is a 68 y.o. female.     Chief Complaint: Pain of the right hand     Referring Provider: Lisa Y. Cooksey, Np  [address] [place] [address]     HPI:  Ms. Mackenzie is a 68-year-old right-hand-dominant female who presented today with complaints of 1 year of right hand pain increasing intensity over the last 6 months.  She stated that her pain began after she was grasping things frequently.  Now when she  items she has increased pain.  Pulling also increases her pain.  Rest improves it.  She stated, at the end of the day I have a hard time gripping a cup or holding a pen.  She has taken NSAIDs with help.  She has not worn a brace, done physical/occupational therapy, nor had injections.          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       Non-melanoma skin cancer    Squamous cell carcinoma in situ of skin of left lower leg      * Wellness examination  Pustular psoriasis              Past Surgical History:   Procedure Laterality Date    AUGMENTATION OF BREAST bilaterally         SECTION x3       COLONOSCOPY   2015     hpp     * MALIGNANT SKIN LESION EXCISION  TUBAL LIGATION   2016              Review of patient's allergies indicates:   Allergen Reactions    Codeine Hives and Swelling         Social History            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     ROS:   Review of Systems   Constitution: Negative for chills and fever.   HENT: Negative for congestion.    Eyes: Negative for blurred vision.   Cardiovascular: Negative for chest pain.   Respiratory: Negative for cough.    Endocrine: Negative for polydipsia.   Hematologic/Lymphatic: Does not bruise/bleed easily.   Skin: Positive for skin cancer.   Musculoskeletal: Positive for joint pain. Negative for gout.   Gastrointestinal: Negative for constipation, diarrhea and heartburn.   Genitourinary: Negative for nocturia.   Neurological: Negative for headaches and seizures.   Psychiatric/Behavioral: Negative for depression.   Allergic/Immunologic: Negative for environmental allergies.      Objective:   Physical Exam:   General: AAOx3.  No acute distress  HEENT: Normocephalic, PEARLA EOMI, Good Dentition  Neck: Supple, No JVD  Chest: Symetric, equal excursion on inspiration  Abdomen: Soft NTND  Vascular:  Pulses intact and equal bilaterally.  Capillary refill less than 3 seconds and equal bilaterally  Neurologic:  Pinprick and soft touch intact and equal bilaterally.  Tinel's negative both hands.  Phalen's negative both hands.  Integment:  No ecchymosis, no errythema  Extremity:  Wrist/hand:  Pronation/supination equal bilaterally 90/90 degrees.  Dorsiflexion/volar flexion equal bilaterally 80/80 degrees.  Nontender in the anatomic snuffbox bilaterally.  Nontender at the 1st dorsal compartment bilaterally.  No swelling at the 1st dorsal compartment bilaterally.  Finkelstein's negative bilaterally.  Grind test positive right thumb.  Unroofing 1st CMC joint both thumbs.  Tender with palpation 1st CMC joint right thumb.  Nontender at the scapholunate interval bilaterally.  Hernandez's test negative bilaterally.  Nontender at the DRUJ/TFCC bilaterally.  Ulnar impaction test negative bilaterally.  Wartenberg sign negative bilaterally.  Durkan's test negative bilaterally.  Full range  of motion fingers both hands.  Mild to moderate interosseous atrophy both hands.  Mild to moderate thenar atrophy both hands.  Radiography:  Personally reviewed x-rays of the completed on 10/13/2023 showed 1st CMC arthritic changes.      Assessment:       Impression:       1. First CMC arthritis, right thumb   2. Right thumb pain         Plan:       1.  Discussed physical examination and radiographic findings with the patient. Gina understands that she has 1st CMC arthritis of her right hand.  Treatment alternatives and outcomes were discussed with the patient she understands she could be treated conservatively with observation, activity modification, NSAIDs, bracing, physical therapy, injections, or she could consider surgical intervention such as 1st CMC implant arthroplasty versus LRTI versus fusion.  Recommend she trial conservative management a little bit longer and if she fails further conservative care then consider surgical intervention..  2.  Offered a steroid injection to the 1st CMC joint of the right hand she elected to proceed.  3.  Continue to take NSAIDs as tolerated allowed by PCM.  4. First CMC joint, right hand, 1 was fitted to the patient.  5. She understands she should purchase some over-the-counter Voltaren gel and applied to her thumb twice daily and massage in for 2 minutes.  6. Follow up p.r.n..

## 2023-10-20 ENCOUNTER — OFFICE VISIT (OUTPATIENT)
Dept: DERMATOLOGY | Facility: CLINIC | Age: 68
End: 2023-10-20
Payer: MEDICARE

## 2023-10-20 VITALS — BODY MASS INDEX: 21.9 KG/M2 | WEIGHT: 153 LBS | HEIGHT: 70 IN

## 2023-10-20 DIAGNOSIS — L82.1 SEBORRHEIC KERATOSES: ICD-10-CM

## 2023-10-20 DIAGNOSIS — L82.0 INFLAMED SEBORRHEIC KERATOSIS: ICD-10-CM

## 2023-10-20 DIAGNOSIS — L57.8 OTHER SKIN CHANGES DUE TO CHRONIC EXPOSURE TO NONIONIZING RADIATION: ICD-10-CM

## 2023-10-20 DIAGNOSIS — Z85.828 ENCOUNTER FOR FOLLOW-UP SURVEILLANCE OF SKIN CANCER: ICD-10-CM

## 2023-10-20 DIAGNOSIS — Z08 ENCOUNTER FOR FOLLOW-UP SURVEILLANCE OF SKIN CANCER: ICD-10-CM

## 2023-10-20 DIAGNOSIS — L40.1 PUSTULAR PSORIASIS: ICD-10-CM

## 2023-10-20 DIAGNOSIS — D48.5 NEOPLASM OF UNCERTAIN BEHAVIOR OF SKIN: Primary | ICD-10-CM

## 2023-10-20 PROCEDURE — 3044F HG A1C LEVEL LT 7.0%: CPT | Mod: CPTII,S$GLB,, | Performed by: DERMATOLOGY

## 2023-10-20 PROCEDURE — 88342 IMHCHEM/IMCYTCHM 1ST ANTB: CPT | Performed by: PATHOLOGY

## 2023-10-20 PROCEDURE — 88305 TISSUE EXAM BY PATHOLOGIST: CPT | Mod: 26,,, | Performed by: PATHOLOGY

## 2023-10-20 PROCEDURE — 17110 PR DESTRUCTION BENIGN LESIONS UP TO 14: ICD-10-PCS | Mod: S$GLB,,, | Performed by: DERMATOLOGY

## 2023-10-20 PROCEDURE — 1157F ADVNC CARE PLAN IN RCRD: CPT | Mod: CPTII,S$GLB,, | Performed by: DERMATOLOGY

## 2023-10-20 PROCEDURE — 3008F PR BODY MASS INDEX (BMI) DOCUMENTED: ICD-10-PCS | Mod: CPTII,S$GLB,, | Performed by: DERMATOLOGY

## 2023-10-20 PROCEDURE — 3288F PR FALLS RISK ASSESSMENT DOCUMENTED: ICD-10-PCS | Mod: CPTII,S$GLB,, | Performed by: DERMATOLOGY

## 2023-10-20 PROCEDURE — 1101F PT FALLS ASSESS-DOCD LE1/YR: CPT | Mod: CPTII,S$GLB,, | Performed by: DERMATOLOGY

## 2023-10-20 PROCEDURE — 17110 DESTRUCTION B9 LES UP TO 14: CPT | Mod: S$GLB,,, | Performed by: DERMATOLOGY

## 2023-10-20 PROCEDURE — 99214 OFFICE O/P EST MOD 30 MIN: CPT | Mod: 25,S$GLB,, | Performed by: DERMATOLOGY

## 2023-10-20 PROCEDURE — 11102 TANGNTL BX SKIN SINGLE LES: CPT | Mod: XS,S$GLB,, | Performed by: DERMATOLOGY

## 2023-10-20 PROCEDURE — 1157F PR ADVANCE CARE PLAN OR EQUIV PRESENT IN MEDICAL RECORD: ICD-10-PCS | Mod: CPTII,S$GLB,, | Performed by: DERMATOLOGY

## 2023-10-20 PROCEDURE — 88305 TISSUE EXAM BY PATHOLOGIST: ICD-10-PCS | Mod: 26,,, | Performed by: PATHOLOGY

## 2023-10-20 PROCEDURE — 11102 PR TANGENTIAL BIOPSY, SKIN, SINGLE LESION: ICD-10-PCS | Mod: XS,S$GLB,, | Performed by: DERMATOLOGY

## 2023-10-20 PROCEDURE — 1101F PR PT FALLS ASSESS DOC 0-1 FALLS W/OUT INJ PAST YR: ICD-10-PCS | Mod: CPTII,S$GLB,, | Performed by: DERMATOLOGY

## 2023-10-20 PROCEDURE — 88305 TISSUE EXAM BY PATHOLOGIST: CPT | Performed by: PATHOLOGY

## 2023-10-20 PROCEDURE — 3288F FALL RISK ASSESSMENT DOCD: CPT | Mod: CPTII,S$GLB,, | Performed by: DERMATOLOGY

## 2023-10-20 PROCEDURE — 88342 CHG IMMUNOCYTOCHEMISTRY: ICD-10-PCS | Mod: 26,,, | Performed by: PATHOLOGY

## 2023-10-20 PROCEDURE — 1159F PR MEDICATION LIST DOCUMENTED IN MEDICAL RECORD: ICD-10-PCS | Mod: CPTII,S$GLB,, | Performed by: DERMATOLOGY

## 2023-10-20 PROCEDURE — 88342 IMHCHEM/IMCYTCHM 1ST ANTB: CPT | Mod: 26,,, | Performed by: PATHOLOGY

## 2023-10-20 PROCEDURE — 3008F BODY MASS INDEX DOCD: CPT | Mod: CPTII,S$GLB,, | Performed by: DERMATOLOGY

## 2023-10-20 PROCEDURE — 3044F PR MOST RECENT HEMOGLOBIN A1C LEVEL <7.0%: ICD-10-PCS | Mod: CPTII,S$GLB,, | Performed by: DERMATOLOGY

## 2023-10-20 PROCEDURE — 99214 PR OFFICE/OUTPT VISIT, EST, LEVL IV, 30-39 MIN: ICD-10-PCS | Mod: 25,S$GLB,, | Performed by: DERMATOLOGY

## 2023-10-20 PROCEDURE — 1159F MED LIST DOCD IN RCRD: CPT | Mod: CPTII,S$GLB,, | Performed by: DERMATOLOGY

## 2023-10-20 PROCEDURE — 1160F RVW MEDS BY RX/DR IN RCRD: CPT | Mod: CPTII,S$GLB,, | Performed by: DERMATOLOGY

## 2023-10-20 PROCEDURE — 1160F PR REVIEW ALL MEDS BY PRESCRIBER/CLIN PHARMACIST DOCUMENTED: ICD-10-PCS | Mod: CPTII,S$GLB,, | Performed by: DERMATOLOGY

## 2023-10-20 RX ORDER — ACITRETIN 10 MG/1
CAPSULE ORAL
Qty: 30 CAPSULE | Refills: 11 | Status: SHIPPED | OUTPATIENT
Start: 2023-10-20

## 2023-10-20 NOTE — PATIENT INSTRUCTIONS
Shave Biopsy Wound Care    Your doctor has performed a shave biopsy today.  A band aid and vaseline ointment has been placed over the site.  This should remain in place for 24 hours.  It is recommended that you keep the area dry for the first 24 hours.  After 24 hours, you may remove the band aid and wash the area with warm soap and water and apply Vaseline jelly.  Many patients prefer to use Neosporin or Bacitracin ointment.  This is acceptable; however, know that you can develop an allergy to this medication even if you have used it safely for years.  It is important to keep the area moist.  Letting it dry out and get air slows healing time, and will worsen the scar.  Band aid is optional after first 24 hours.      If you notice increasing redness, tenderness, pain, or yellow drainage at the biopsy site, please notify your doctor.  These are signs of an infection.    If your biopsy site is bleeding, apply firm pressure for 15 minutes straight.  Repeat for another 15 minutes, if it is still bleeding.   If the surgical site continues to bleed, then please contact your doctor.       [facility] - DERMATOLOGY  [address], [address]  [place] [address]  Dept: [phone]  Dept Fax: [phone]      CRYOSURGERY      Your doctor has used a method called cryosurgery to treat your skin condition. Cryosurgery refers to the use of very cold substances to treat a variety of skin conditions such as warts, pre-skin cancers, molluscum contagiosum, sun spots, and several benign growths. The substance we use in cryosurgery is liquid nitrogen and is so cold (-195 degrees Celsius) that is burns when administered.     Following treatment in the office, the skin may immediately burn and become red. You may find the area around the lesion is affected as well. It is sometimes necessary to treat not only the lesion, but a small area of the surrounding normal skin to achieve a good response.     A  blister, and even a blood filled blister, may form after treatment.   This is a normal response. If the blister is painful, it is acceptable to sterilize a needle and with rubbing alcohol and gently pop the blister. It is important that you gently wash the area with soap and warm water as the blister fluid may contain wart virus if a wart was treated. Do no remove the roof of the blister.     The area treated can take anywhere from 1-3 weeks to heal. Healing time depends on the kind skin lesion treated, the location, and how aggressively the lesion was treated. It is recommended that the areas treated are covered with Vaseline or bacitracin ointment and a band-aid. If a band-aid is not practical, just ointment applied several times per day will do. Keeping these areas moist will speed the healing time.    Treatment with liquid nitrogen can leave a scar. In dark skin, it may be a light or dark scar, in light skin it may be a white or pink scar. These will generally fade with time, but may never go away completely.     If you have any concerns after your treatment, please feel free to call the office.         [facility] - DERMATOLOGY  [address], [address]  [place] [address]  Dept: [phone]  Dept Fax: [phone]

## 2023-10-20 NOTE — PROGRESS NOTES
Subjective:      Patient ID:  Gina Mackenzie is a 68 y.o. female who presents for   Chief Complaint   Patient presents with    Skin Check     TBSC      LOV 4/20/23 Hypotrichosis of eyelid    Patient here today for TBSC  C/O spots to back x a while, some are itchy.  C/O spots to face, no symptoms.    Pt has no concerns in regards to Psoriasis controlling with Acitretin 10mg capsule.    Derm Hx:  Denies Phx of NMSC  Denies Fhx of MM    Current Outpatient Medications:   ·  acitretin (SORIATANE) 10 MG capsule, Take one cap PO daily with food, Disp: 30 capsule, Rfl: 11  ·  bimatoprost (LATISSE) 0.03 % ophthalmic solution, Place one drop on applicator and apply evenly along the skin of the upper eyelid at base of eyelashes once daily at bedtime; repeat procedure for second eye (use a clean applicator)., Disp: 5 mL, Rfl: 4  ·  busPIRone (BUSPAR) 10 MG tablet, Take 1 tablet (10 mg total) by mouth 2 (two) times daily., Disp: 180 tablet, Rfl: 1  ·  cyanocobalamin, vitamin B-12, (VITAMIN B-12 ORAL), Take by mouth., Disp: , Rfl:   ·  estradioL (ESTRACE) 0.01 % (0.1 mg/gram) vaginal cream, Place 1 g vaginally twice a week., Disp: 43 g, Rfl: 0  ·  estradioL (ESTRACE) 0.5 MG tablet, Take 1 tablet (0.5 mg total) by mouth once daily., Disp: 90 tablet, Rfl: 1  ·  levothyroxine (EUTHYROX) 100 MCG tablet, Take 1 tablet (100 mcg total) by mouth before breakfast., Disp: 90 tablet, Rfl: 2  ·  medroxyPROGESTERone (PROVERA) 2.5 MG tablet, Take 1 tablet (2.5 mg total) by mouth once daily., Disp: 90 tablet, Rfl: 1  ·  multivitamin (THERAGRAN) per tablet, Take 1 tablet by mouth once daily., Disp: , Rfl:   ·  valACYclovir (VALTREX) 1000 MG tablet, Take 1 cap PO BID for 5 days PRN flare,, Disp: 40 tablet, Rfl: 1  ·  vit A/vit C/vit E/zinc/copper (PRESERVISION AREDS ORAL), Take by mouth., Disp: , Rfl:         Review of Systems   Constitutional:  Negative for fever, chills, fatigue and malaise.   Respiratory:  Negative for cough and shortness of  breath.    Skin:  Positive for daily sunscreen use and activity-related sunscreen use. Negative for itching, rash, dry skin, sun sensitivity, sensitivity to antibiotic ointment, recent sunburn, dry lips and wears hat.   Hematologic/Lymphatic: Bruises/bleeds easily.       Objective:   Physical Exam   Constitutional: She appears well-developed and well-nourished. No distress.   HENT:   Mouth/Throat: Lips normal.    Eyes: Lids are normal.    Neurological: She is alert and oriented to person, place, and time. She is not disoriented.   Psychiatric: She has a normal mood and affect. She is not agitated.   Skin:   Areas Examined (abnormalities noted in diagram):   Scalp / Hair Palpated and Inspected  Head / Face Inspection Performed  Neck Inspection Performed  Back Inspection Performed  RUE Inspected  LUE Inspection Performed  RLE Inspected  LLE Inspection Performed                         Diagram Legend     Erythematous scaling macule/papule c/w actinic keratosis       Vascular papule c/w angioma      Pigmented verrucoid papule/plaque c/w seborrheic keratosis      Yellow umbilicated papule c/w sebaceous hyperplasia      Irregularly shaped tan macule c/w lentigo     1-2 mm smooth white papules consistent with Milia      Movable subcutaneous cyst with punctum c/w epidermal inclusion cyst      Subcutaneous movable cyst c/w pilar cyst      Firm pink to brown papule c/w dermatofibroma      Pedunculated fleshy papule(s) c/w skin tag(s)      Evenly pigmented macule c/w junctional nevus     Mildly variegated pigmented, slightly irregular-bordered macule c/w mildly atypical nevus      Flesh colored to evenly pigmented papule c/w intradermal nevus       Pink pearly papule/plaque c/w basal cell carcinoma      Erythematous hyperkeratotic cursted plaque c/w SCC      Surgical scar with no sign of skin cancer recurrence      Open and closed comedones      Inflammatory papules and pustules      Verrucoid papule consistent consistent with  wart     Erythematous eczematous patches and plaques     Dystrophic onycholytic nail with subungual debris c/w onychomycosis     Umbilicated papule    Erythematous-base heme-crusted tan verrucoid plaque consistent with inflamed seborrheic keratosis     Erythematous Silvery Scaling Plaque c/w Psoriasis     See annotation       Latest Reference Range & Units 06/12/23 09:30   WBC 3.8 - 10.8 Thousand/uL 4.6   RBC 3.80 - 5.10 Million/uL 4.83   Hemoglobin 11.7 - 15.5 g/dL 14.8   Hematocrit 35.0 - 45.0 % 45.1 (H)   MCV 80.0 - 100.0 fL 93.4   MCH 27.0 - 33.0 pg 30.6   MCHC 32.0 - 36.0 g/dL 32.8   RDW 11.0 - 15.0 % 12.2   Platelet Count 140 - 400 Thousand/uL 232   MPV 7.5 - 12.5 fL 10.9   Neutrophils Relative % 61.3   Lymph % % 27.5   Mono % % 9.2   Eosinophil % % 0.9   Basophil % % 1.1   Neutrophils, Abs 1,500 - 7,800 cells/uL 2,820   Lymph # 850 - 3,900 cells/uL 1,265   Mono # 200 - 950 cells/uL 423   Eos # 15 - 500 cells/uL 41   Baso # 0 - 200 cells/uL 51   Sodium 135 - 146 mmol/L 138   Potassium 3.5 - 5.3 mmol/L 5.3   Chloride 98 - 110 mmol/L 101   CO2 20 - 32 mmol/L 28   BUN 7 - 25 mg/dL 12   Creatinine 0.50 - 1.05 mg/dL 0.94   BUN/CREAT RATIO 6 - 22 (calc) NOT APPLICABLE   eGFR > OR = 60 mL/min/1.73m2 66   Glucose 65 - 139 mg/dL 94   Calcium 8.6 - 10.4 mg/dL 9.2   ALP 37 - 153 U/L 49   PROTEIN TOTAL 6.1 - 8.1 g/dL 7.2   Albumin 3.6 - 5.1 g/dL 4.3   Albumin/Globulin Ratio 1.0 - 2.5 (calc) 1.5   BILIRUBIN TOTAL 0.2 - 1.2 mg/dL 0.8   AST 10 - 35 U/L 14   ALT 6 - 29 U/L 6   Globulin, Total 1.9 - 3.7 g/dL (calc) 2.9   Cholesterol Total <200 mg/dL 205 (H)   HDL > OR = 50 mg/dL 82   HDL/Cholesterol Ratio <5.0 (calc) 2.5   LDL Cholesterol External mg/dL (calc) 102 (H)   Non HDL Chol. (LDL+VLDL) <130 mg/dL (calc) 123   Triglycerides <150 mg/dL 116   Hemoglobin A1C External <5.7 % of total Hgb 5.1   TSH 0.40 - 4.50 mIU/L 4.86 (H)   T4, Free 0.8 - 1.8 ng/dL 1.2   EAG (MG/DL) mg/dL 100   EAG (MMOL/L) mmol/L 5.5   (H): Data is  abnormally high    Assessment / Plan:      Pathology Orders:       Normal Orders This Visit    Specimen to Pathology, Dermatology     Comments:    Number of Specimens:->1  ------------------------->-------------------------  Spec 1 Procedure:->Biopsy  Spec 1 Clinical Impression:->new melanocytic macule with  clinical atypia  Spec 1 Source:->R lower flank    Questions:    Procedure Type: Dermatology and skin neoplasms    Number of Specimens: 1    ------------------------: -------------------------    Spec 1 Procedure: Biopsy    Spec 1 Clinical Impression: new melanocytic macule with clinical atypia    Spec 1 Source: R lower flank    Release to patient:           Neoplasm of uncertain behavior of skin  -     Specimen to Pathology, Dermatology  Shave biopsy procedure note:    Shave biopsy performed after verbal consent including risk of infection, scar, recurrence, need for additional treatment of site. Area prepped with alcohol, anesthetized with approximately 1.0cc of 1% lidocaine with epinephrine. Lesional tissue shaved with razor blade. Hemostasis achieved with application of aluminum chloride followed by hyfrecation. No complications. Dressing applied. Wound care explained.    Inflamed seborrheic keratoses  Cryosurgery procedure note:    Verbal consent from the patient is obtained. Liquid nitrogen cryosurgery is applied to 6 lesions to produce a freeze injury. The patient is aware that blisters may form and is instructed on wound care with gentle cleansing and use of vaseline ointment to keep moist until healed. The patient is supplied a handout on cryosurgery and is instructed to call if lesions do not completely resolve.    Pustular psoriasis  Soles, well controlled with soriatane 10mg  Refill  Labs up to date    Encounter for follow-up surveillance of skin cancer  Area of previous SCc (left shin) examined. Site well healed with no signs of recurrence.    Total body skin examination performed today including at  least 12 points as noted in physical examination. 1 lesions suspicious for malignancy noted.    Seborrheic keratoses  These are benign inherited growths without a malignant potential. Reassurance given to patient. No treatment is necessary.     Other skin changes due to chronic exposure to nonionizing radiation  Patient instructed in importance in daily broad spectrum sun protection of at least spf 30. Mineral sunscreen ingredients preferred (Zinc +/- Titanium) and can be found OTC.   Recommend Elta MD for daily use on face and neck.  Patient encouraged to wear hat for all outdoor exposure.   Also discussed sun avoidance and use of protective clothing.             Follow up in about 1 year (around 10/20/2024), or if symptoms worsen or fail to improve.

## 2023-10-24 ENCOUNTER — TELEPHONE (OUTPATIENT)
Dept: DERMATOLOGY | Facility: CLINIC | Age: 68
End: 2023-10-24
Payer: MEDICARE

## 2023-10-24 NOTE — TELEPHONE ENCOUNTER
Letter received from [facility] that pt's acitretin 10mg capsule has been approved thru 12/31/2024

## 2023-10-30 LAB
FINAL PATHOLOGIC DIAGNOSIS: NORMAL
GROSS: NORMAL
Lab: NORMAL
MICROSCOPIC EXAM: NORMAL

## 2024-01-22 ENCOUNTER — HOSPITAL ENCOUNTER (OUTPATIENT)
Dept: RADIOLOGY | Facility: HOSPITAL | Age: 69
Discharge: HOME | End: 2024-01-22
Attending: NURSE PRACTITIONER
Payer: MEDICARE

## 2024-01-22 DIAGNOSIS — Z12.31 ENCOUNTER FOR SCREENING MAMMOGRAM FOR MALIGNANT NEOPLASM OF BREAST: ICD-10-CM

## 2024-01-22 PROCEDURE — 77067 SCR MAMMO BI INCL CAD: CPT | Mod: TC

## 2024-01-22 PROCEDURE — 77067 SCR MAMMO BI INCL CAD: CPT | Mod: 26,,, | Performed by: RADIOLOGY

## 2024-01-22 PROCEDURE — 77063 BREAST TOMOSYNTHESIS BI: CPT | Mod: 26,,, | Performed by: RADIOLOGY

## 2024-02-02 ENCOUNTER — HOSPITAL ENCOUNTER (OUTPATIENT)
Dept: RADIOLOGY | Facility: HOSPITAL | Age: 69
Discharge: HOME | End: 2024-02-02
Attending: NURSE PRACTITIONER
Payer: MEDICARE

## 2024-02-02 DIAGNOSIS — R92.8 ABNORMAL MAMMOGRAM OF LEFT BREAST: ICD-10-CM

## 2024-02-02 PROCEDURE — 77065 DX MAMMO INCL CAD UNI: CPT | Mod: TC,LT

## 2024-02-02 PROCEDURE — 77061 BREAST TOMOSYNTHESIS UNI: CPT | Mod: TC,LT

## 2024-02-02 PROCEDURE — 76642 ULTRASOUND BREAST LIMITED: CPT | Mod: 26,LT,, | Performed by: RADIOLOGY

## 2024-02-02 PROCEDURE — 77061 BREAST TOMOSYNTHESIS UNI: CPT | Mod: 26,LT,, | Performed by: RADIOLOGY

## 2024-02-02 PROCEDURE — 77065 DX MAMMO INCL CAD UNI: CPT | Mod: 26,LT,, | Performed by: RADIOLOGY

## 2024-02-02 PROCEDURE — 76642 ULTRASOUND BREAST LIMITED: CPT | Mod: TC,LT

## 2024-07-10 DIAGNOSIS — H02.729 HYPOTRICHOSIS OF EYELID, UNSPECIFIED LATERALITY: ICD-10-CM

## 2024-07-11 ENCOUNTER — TELEPHONE (OUTPATIENT)
Dept: DERMATOLOGY | Facility: CLINIC | Age: 69
End: 2024-07-11
Payer: MEDICARE

## 2024-07-11 RX ORDER — BIMATOPROST 3 UG/ML
SOLUTION TOPICAL
Qty: 5 ML | Refills: 4 | Status: SHIPPED | OUTPATIENT
Start: 2024-07-11

## 2024-07-11 NOTE — TELEPHONE ENCOUNTER
----- Message from Erin Ojeda sent at 7/11/2024  3:16 PM CDT -----  Regarding: Call back  Type:  Needs Medical Advice    Who Called: Deni/ Reggie    Would the patient rather a call back or a response via [facility]? Callback    Best Call Back Number: [phone]    Additional Information: Trying to complete a PA for pt. Thank you

## 2024-08-30 ENCOUNTER — OFFICE VISIT (OUTPATIENT)
Dept: URGENT CARE | Facility: CLINIC | Age: 69
End: 2024-08-30
Payer: MEDICARE

## 2024-08-30 VITALS
BODY MASS INDEX: 22.36 KG/M2 | HEIGHT: 69 IN | TEMPERATURE: 98 F | WEIGHT: 151 LBS | DIASTOLIC BLOOD PRESSURE: 65 MMHG | SYSTOLIC BLOOD PRESSURE: 107 MMHG | HEART RATE: 82 BPM | RESPIRATION RATE: 16 BRPM | OXYGEN SATURATION: 95 %
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
## 2024-08-30 RX ORDER — AMOXICILLIN AND CLAVULANATE POTASSIUM 500; 125 MG/1; MG/1
1 TABLET, FILM COATED ORAL EVERY 8 HOURS
Qty: 30 TABLET | Refills: 0 | Status: SHIPPED | OUTPATIENT
Start: 2024-08-30 | End: 2024-09-09

## 2024-08-30 NOTE — PROGRESS NOTES
"Subjective:      Patient ID: Gina Mackenzie is a 69 y.o. female.    Vitals:  height is 5' 9" (1.753 m) and weight is 68.5 kg (151 lb). Her oral temperature is 97.9 °F (36.6 °C). Her blood pressure is 107/65 and her pulse is 82. Her respiration is 16 and oxygen saturation is 95%.     Chief Complaint: Animal Bite    69 y.o. female who presents today with a chief complaint of dog bite ("a patient's dog") to middle finger of right hand that occurred yesterday. Home treatments include soaking in peroxide and Neosporin. Patient is unsure about the date of her last tetanus shot.    Animal Bite   The incident occurred yesterday. The incident occurred at home. There is an injury to the Right long finger. The pain is mild. It is unlikely that a foreign body is present. There have been no prior injuries to these areas. She is Right-handed. Her tetanus status is unknown. She has received no recent medical care.       Skin:  Positive for wound and erythema.      Objective:     Physical Exam   Constitutional: She is oriented to person, place, and time.  Non-toxic appearance. She does not appear ill. No distress. normal  HENT:   Head: Normocephalic and atraumatic.   Eyes: Conjunctivae are normal. Extraocular movement intact   Neck: Neck supple.   Cardiovascular: Normal rate, regular rhythm, normal heart sounds and normal pulses.   Pulmonary/Chest: Effort normal and breath sounds normal.   Abdominal: Normal appearance.   Musculoskeletal: Normal range of motion.         General: Signs of injury present. No swelling or tenderness. Normal range of motion.      Comments: There is a small wound with some mild erythema localized to lateral aspect of distal 3rd digit of the right hand.   Neurological: She is alert and oriented to person, place, and time.   Skin: Skin is warm, dry and not diaphoretic. erythema   Psychiatric: Her behavior is normal.   Vitals reviewed.     XR HAND COMPLETE 3 VIEW RIGHT    Result Date: 8/30/2024  EXAMINATION: " XR HAND COMPLETE 3 VIEW RIGHT CLINICAL HISTORY: Bitten by dog, initial encounter TECHNIQUE: PA, lateral, and oblique views of the right hand were performed. COMPARISON: Wrist x-rays 10/13/2023 FINDINGS: There is no fracture, dislocation or radiopaque foreign body.  No gas in the soft tissues.  No bone destruction or periosteal elevation.  There are scattered mild degenerative changes, primarily at the wrist and base of thumb.  There is mild soft tissue swelling in the tip of the middle finger.     Slight soft tissue swelling tip of middle finger.  No fracture or foreign body. Electronically signed by: Sarita Vaz Date:    08/30/2024 Time:    14:22     *NOTE: Tetatnus was updated and administered in clinic today (See MA's note).  Assessment:     1. Dog bite, initial encounter        Plan:       Dog bite, initial encounter  -     XR HAND COMPLETE 3 VIEW RIGHT; Future; Expected date: 08/30/2024  -     VFC-tetanus and diphther. tox (PF) (TENIVAC) 5 Lf unit- 2 Lf unit/0.5mL vaccine 0.5 mL  -     amoxicillin-clavulanate 500-125mg (AUGMENTIN) 500-125 mg Tab; Take 1 tablet (500 mg total) by mouth every 8 (eight) hours. for 10 days  Dispense: 30 tablet; Refill: 0      INSTRUCTIONS  Meds as prescribed. Wound care as instructed. Follow up as advised. To ER for any of the signs and symptoms discussed with you at time of discharge today.

## 2024-10-15 DIAGNOSIS — L40.1 PUSTULAR PSORIASIS: ICD-10-CM

## 2024-10-15 RX ORDER — ACITRETIN 10 MG/1
CAPSULE ORAL
Qty: 30 CAPSULE | Refills: 0 | Status: SHIPPED | OUTPATIENT
Start: 2024-10-15

## 2024-12-02 ENCOUNTER — TELEPHONE (OUTPATIENT)
Dept: DERMATOLOGY | Facility: CLINIC | Age: 69
End: 2024-12-02
Payer: MEDICARE

## 2024-12-02 NOTE — TELEPHONE ENCOUNTER
Attempted to contact patient, patient needs an appointment before any refills can be sent,     ----- Message from Danika sent at 12/2/2024 10:05 AM CST -----  Contact: self  Type:  RX Refill Request    Who Called: Pt  Refill or New Rx:Refill   RX Name and Strength: acitretin (SORIATANE) 10 MG capsule  How is the patient currently taking it? (ex. 1XDay): as directed  Is this a 30 day or 90 day RX:   Preferred Pharmacy with phone number:   [facility] [address] [facility], [place] - [address] [address]  Phone: [phone] Fax: [phone]  Local or Mail Order: Local   Ordering Provider: Dr. Barrientos  Would the patient rather a call back or a response via [facility]? Call  Best Call Back Number: [phone]  Please call to advise... Thank you...

## 2024-12-04 DIAGNOSIS — L40.1 PUSTULAR PSORIASIS: ICD-10-CM

## 2024-12-04 RX ORDER — ACITRETIN 10 MG/1
CAPSULE ORAL
Qty: 30 CAPSULE | Refills: 0 | OUTPATIENT
Start: 2024-12-04

## 2024-12-04 NOTE — TELEPHONE ENCOUNTER
Called patient and got her scheduled for an appointment for Dec 17th. Patient wants to know if she can have enough to get her to her appointment.     ----- Message from Yolanda sent at 12/4/2024  4:08 PM CST -----  Contact: Patient  Type:  RX Refill Request    Who Called: Patient    Refill or New Rx:refill  \  RX Name and Strength:acitretin (SORIATANE) 10 MG capsule    How is the patient currently taking it? (ex. 1XDay): 1 x day     Is this a 30 day or 90 day RX: 30     Preferred Pharmacy with phone number:.  [facility], [place] - [address] [place] [address]  Phone: [phone] Fax: [phone]      Local or Mail Order:Local    Ordering Provider:Floyd    Would the patient rather a call back or a response via [facility]? Call    Best Call Back Number:[phone]    Additional Information: Please call to advise

## 2024-12-05 ENCOUNTER — OFFICE VISIT (OUTPATIENT)
Dept: DERMATOLOGY | Facility: CLINIC | Age: 69
End: 2024-12-05
Payer: MEDICARE

## 2024-12-05 VITALS — WEIGHT: 151 LBS | HEIGHT: 69 IN | BODY MASS INDEX: 22.36 KG/M2

## 2024-12-05 DIAGNOSIS — L40.1 PUSTULAR PSORIASIS: ICD-10-CM

## 2024-12-05 PROCEDURE — 1101F PT FALLS ASSESS-DOCD LE1/YR: CPT | Mod: CPTII,S$GLB,, | Performed by: DERMATOLOGY

## 2024-12-05 PROCEDURE — 1160F RVW MEDS BY RX/DR IN RCRD: CPT | Mod: CPTII,S$GLB,, | Performed by: DERMATOLOGY

## 2024-12-05 PROCEDURE — 3008F BODY MASS INDEX DOCD: CPT | Mod: CPTII,S$GLB,, | Performed by: DERMATOLOGY

## 2024-12-05 PROCEDURE — 1159F MED LIST DOCD IN RCRD: CPT | Mod: CPTII,S$GLB,, | Performed by: DERMATOLOGY

## 2024-12-05 PROCEDURE — 99213 OFFICE O/P EST LOW 20 MIN: CPT | Mod: S$GLB,,, | Performed by: DERMATOLOGY

## 2024-12-05 PROCEDURE — 3288F FALL RISK ASSESSMENT DOCD: CPT | Mod: CPTII,S$GLB,, | Performed by: DERMATOLOGY

## 2024-12-05 RX ORDER — ACITRETIN 10 MG/1
CAPSULE ORAL
Qty: 90 CAPSULE | Refills: 3 | Status: SHIPPED | OUTPATIENT
Start: 2024-12-05 | End: 2024-12-05 | Stop reason: SDUPTHER

## 2024-12-05 RX ORDER — ACITRETIN 10 MG/1
CAPSULE ORAL
Qty: 30 CAPSULE | Refills: 11 | Status: SHIPPED | OUTPATIENT
Start: 2024-12-05 | End: 2024-12-05 | Stop reason: SDUPTHER

## 2024-12-05 RX ORDER — ACITRETIN 10 MG/1
CAPSULE ORAL
Qty: 30 CAPSULE | Refills: 11 | Status: SHIPPED | OUTPATIENT
Start: 2024-12-05

## 2024-12-05 RX ORDER — SIMVASTATIN 10 MG/1
TABLET, FILM COATED ORAL
COMMUNITY

## 2024-12-05 NOTE — PROGRESS NOTES
Subjective:      Patient ID:  Gina Mackenzie is a 69 y.o. female who presents for   Chief Complaint   Patient presents with    Psoriasis     LOV: 10/20/23 - NUB, psoriasis, SK, ISK    Skin, right lower flank, shave biopsy:   - LENTIGO SIMPLEX.     Patient here for Psoriasis follow up  Well controlled with Acitretin, no complaints     Derm Hx:  Denies Phx of NMSC  Denies Fhx of MM    Current Outpatient Medications:   ·  acitretin (SORIATANE) 10 MG capsule, TAKE 1 CAPSULE BY MOUTH ONCE DAILY WITH FOOD, Disp: 30 capsule, Rfl: 0  ·  bimatoprost (LATISSE) 0.03 % ophthalmic solution, Place one drop on applicator and apply evenly along the skin of the upper eyelid at base of eyelashes once daily at bedtime; repeat procedure for second eye (use a clean applicator)., Disp: 5 mL, Rfl: 4  ·  busPIRone (BUSPAR) 10 MG tablet, Take 1 tablet (10 mg total) by mouth 2 (two) times daily., Disp: 180 tablet, Rfl: 2  ·  colchicine (COLCRYS) 0.6 mg tablet, On day 1 take 1.2mg by mouth at the first sign of flare, followed by 0.6 mg after 1 hour. On day 2 take 0.6mg twice daily until flare resolves, Disp: 30 tablet, Rfl: 1  ·  EScitalopram oxalate (LEXAPRO) 10 MG tablet, Take 1 tablet by mouth once daily, Disp: 30 tablet, Rfl: 0  ·  estradioL (ESTRACE) 0.5 MG tablet, Take 1 tablet (0.5 mg total) by mouth once daily., Disp: 90 tablet, Rfl: 1  ·  levothyroxine (SYNTHROID) 100 MCG tablet, Take 1 tablet (100 mcg total) by mouth before breakfast., Disp: 90 tablet, Rfl: 2  ·  medroxyPROGESTERone (PROVERA) 2.5 MG tablet, Take 1 tablet (2.5 mg total) by mouth once daily., Disp: 90 tablet, Rfl: 1  ·  simvastatin (ZOCOR) 10 MG tablet, SMARTSI.0 Tablet(s) By Mouth Every Night, Disp: , Rfl:   ·  valACYclovir (VALTREX) 1000 MG tablet, Take 1 cap PO BID for 5 days PRN flare,, Disp: 40 tablet, Rfl: 1  ·  vit A/vit C/vit E/zinc/copper (PRESERVISION AREDS ORAL), Take by mouth., Disp: , Rfl:             Review of Systems   Constitutional:  Negative for  fever, chills, fatigue and malaise.   Respiratory:  Negative for cough and shortness of breath.    Skin:  Positive for daily sunscreen use and activity-related sunscreen use. Negative for itching, rash, dry skin, sun sensitivity, sensitivity to antibiotic ointment, recent sunburn, dry lips and wears hat.   Hematologic/Lymphatic: Bruises/bleeds easily.       Objective:   Physical Exam   Constitutional: She appears well-developed and well-nourished. No distress.   HENT:   Mouth/Throat: Lips normal.    Eyes: Lids are normal.    Neurological: She is alert and oriented to person, place, and time. She is not disoriented.   Psychiatric: She has a normal mood and affect. She is not agitated.   Skin:   Areas Examined (abnormalities noted in diagram):   Scalp / Hair Palpated and Inspected  Head / Face Inspection Performed  Neck Inspection Performed  Back Inspection Performed  RUE Inspected  LUE Inspection Performed  RLE Inspected  LLE Inspection Performed                         Diagram Legend     Erythematous scaling macule/papule c/w actinic keratosis       Vascular papule c/w angioma      Pigmented verrucoid papule/plaque c/w seborrheic keratosis      Yellow umbilicated papule c/w sebaceous hyperplasia      Irregularly shaped tan macule c/w lentigo     1-2 mm smooth white papules consistent with Milia      Movable subcutaneous cyst with punctum c/w epidermal inclusion cyst      Subcutaneous movable cyst c/w pilar cyst      Firm pink to brown papule c/w dermatofibroma      Pedunculated fleshy papule(s) c/w skin tag(s)      Evenly pigmented macule c/w junctional nevus     Mildly variegated pigmented, slightly irregular-bordered macule c/w mildly atypical nevus      Flesh colored to evenly pigmented papule c/w intradermal nevus       Pink pearly papule/plaque c/w basal cell carcinoma      Erythematous hyperkeratotic cursted plaque c/w SCC      Surgical scar with no sign of skin cancer recurrence      Open and closed comedones       Inflammatory papules and pustules      Verrucoid papule consistent consistent with wart     Erythematous eczematous patches and plaques     Dystrophic onycholytic nail with subungual debris c/w onychomycosis     Umbilicated papule    Erythematous-base heme-crusted tan verrucoid plaque consistent with inflamed seborrheic keratosis     Erythematous Silvery Scaling Plaque c/w Psoriasis     See annotation      Assessment / Plan:        Pustular psoriasis  -     acitretin (SORIATANE) 10 MG capsule; TAKE 1 CAPSULE BY MOUTH ONCE DAILY WITH FOOD  Dispense: 30 capsule; Refill: 11    Labs checked by NP at [facility] (not visible to be)  10/2024, CBC CMP lipids TSH  Normal per patient except cholesterol mild elevation, started 10mg simvastatin, with a normal repeat on meds  Acitretin controls patient issue (soles),after failing MTX, low BSA makes biologics not first line, happy with results, no s.e.,  continue           No follow-ups on file.

## 2025-02-07 ENCOUNTER — HOSPITAL ENCOUNTER (OUTPATIENT)
Dept: RADIOLOGY | Facility: HOSPITAL | Age: 70
Discharge: HOME | End: 2025-02-07
Attending: NURSE PRACTITIONER
Payer: MEDICARE

## 2025-02-07 DIAGNOSIS — Z12.31 ENCOUNTER FOR SCREENING MAMMOGRAM FOR MALIGNANT NEOPLASM OF BREAST: ICD-10-CM

## 2025-02-07 PROCEDURE — 77063 BREAST TOMOSYNTHESIS BI: CPT | Mod: 26,,, | Performed by: RADIOLOGY

## 2025-02-07 PROCEDURE — 77067 SCR MAMMO BI INCL CAD: CPT | Mod: 26,,, | Performed by: RADIOLOGY

## 2025-02-07 PROCEDURE — 77067 SCR MAMMO BI INCL CAD: CPT | Mod: TC
